# Patient Record
Sex: FEMALE | Race: BLACK OR AFRICAN AMERICAN
[De-identification: names, ages, dates, MRNs, and addresses within clinical notes are randomized per-mention and may not be internally consistent; named-entity substitution may affect disease eponyms.]

---

## 2017-03-19 ENCOUNTER — HOSPITAL ENCOUNTER (INPATIENT)
Dept: HOSPITAL 61 - ER | Age: 72
LOS: 1 days | Discharge: HOME | DRG: 916 | End: 2017-03-20
Attending: INTERNAL MEDICINE | Admitting: INTERNAL MEDICINE
Payer: MEDICARE

## 2017-03-19 VITALS — WEIGHT: 220 LBS | BODY MASS INDEX: 41.54 KG/M2 | HEIGHT: 61 IN

## 2017-03-19 DIAGNOSIS — N39.0: ICD-10-CM

## 2017-03-19 DIAGNOSIS — J44.9: ICD-10-CM

## 2017-03-19 DIAGNOSIS — Z91.013: ICD-10-CM

## 2017-03-19 DIAGNOSIS — T78.2XXA: Primary | ICD-10-CM

## 2017-03-19 DIAGNOSIS — R21: ICD-10-CM

## 2017-03-19 DIAGNOSIS — Z79.51: ICD-10-CM

## 2017-03-19 DIAGNOSIS — R22.1: ICD-10-CM

## 2017-03-19 DIAGNOSIS — Z98.84: ICD-10-CM

## 2017-03-19 DIAGNOSIS — Z88.1: ICD-10-CM

## 2017-03-19 DIAGNOSIS — Z88.5: ICD-10-CM

## 2017-03-19 DIAGNOSIS — I10: ICD-10-CM

## 2017-03-19 DIAGNOSIS — L27.2: ICD-10-CM

## 2017-03-19 DIAGNOSIS — Z98.51: ICD-10-CM

## 2017-03-19 DIAGNOSIS — J45.909: ICD-10-CM

## 2017-03-19 DIAGNOSIS — Z88.8: ICD-10-CM

## 2017-03-19 DIAGNOSIS — R00.0: ICD-10-CM

## 2017-03-19 LAB
ANION GAP SERPL CALC-SCNC: 12 MMOL/L (ref 6–14)
BASOPHILS # BLD AUTO: 0.1 X10^3/UL (ref 0–0.2)
BASOPHILS NFR BLD: 1 % (ref 0–3)
BUN SERPL-MCNC: 19 MG/DL (ref 7–20)
CALCIUM SERPL-MCNC: 8.8 MG/DL (ref 8.5–10.1)
CHLORIDE SERPL-SCNC: 108 MMOL/L (ref 98–107)
CO2 SERPL-SCNC: 26 MMOL/L (ref 21–32)
CREAT SERPL-MCNC: 0.9 MG/DL (ref 0.6–1)
EOSINOPHIL NFR BLD: 4 % (ref 0–3)
ERYTHROCYTE [DISTWIDTH] IN BLOOD BY AUTOMATED COUNT: 16 % (ref 11.5–14.5)
GFR SERPLBLD BASED ON 1.73 SQ M-ARVRAT: 75.1 ML/MIN
GLUCOSE SERPL-MCNC: 164 MG/DL (ref 70–99)
HCT VFR BLD CALC: 38.2 % (ref 36–47)
HGB BLD-MCNC: 12.3 G/DL (ref 12–15.5)
LYMPHOCYTES # BLD: 3.6 X10^3/UL (ref 1–4.8)
LYMPHOCYTES NFR BLD AUTO: 39 % (ref 24–48)
MCH RBC QN AUTO: 31 PG (ref 25–35)
MCHC RBC AUTO-ENTMCNC: 32 G/DL (ref 31–37)
MCV RBC AUTO: 95 FL (ref 79–100)
MONOCYTES NFR BLD: 8 % (ref 0–9)
NEUTROPHILS NFR BLD AUTO: 49 % (ref 31–73)
PLATELET # BLD AUTO: 416 X10^3/UL (ref 140–400)
POTASSIUM SERPL-SCNC: 4.1 MMOL/L (ref 3.5–5.1)
RBC # BLD AUTO: 4.02 X10^6/UL (ref 3.5–5.4)
SODIUM SERPL-SCNC: 146 MMOL/L (ref 136–145)
WBC # BLD AUTO: 9.3 X10^3/UL (ref 4–11)

## 2017-03-19 PROCEDURE — 87641 MR-STAPH DNA AMP PROBE: CPT

## 2017-03-19 PROCEDURE — 85027 COMPLETE CBC AUTOMATED: CPT

## 2017-03-19 PROCEDURE — 96361 HYDRATE IV INFUSION ADD-ON: CPT

## 2017-03-19 PROCEDURE — 84443 ASSAY THYROID STIM HORMONE: CPT

## 2017-03-19 PROCEDURE — 87086 URINE CULTURE/COLONY COUNT: CPT

## 2017-03-19 PROCEDURE — 71010: CPT

## 2017-03-19 PROCEDURE — 93005 ELECTROCARDIOGRAM TRACING: CPT

## 2017-03-19 PROCEDURE — 36415 COLL VENOUS BLD VENIPUNCTURE: CPT

## 2017-03-19 PROCEDURE — 80048 BASIC METABOLIC PNL TOTAL CA: CPT

## 2017-03-19 PROCEDURE — S0028 INJECTION, FAMOTIDINE, 20 MG: HCPCS

## 2017-03-19 PROCEDURE — 81001 URINALYSIS AUTO W/SCOPE: CPT

## 2017-03-19 PROCEDURE — 96374 THER/PROPH/DIAG INJ IV PUSH: CPT

## 2017-03-19 NOTE — ED.ADGEN
Past Medical History


Past Medical History:  Asthma, Hypertension


Additional Past Medical Histor:  Pericarditis.


Past Surgical History:  Tonsillectomy, Tubal ligation


Additional Past Surgical Histo:  Gastric Bypass, fistula removal, breast glands 

removed (49 years ago).


Alcohol Use:  None


Drug Use:  None





Adult General


Chief Complaint


Chief Complaint:  ALLERGIC REACTION





HPI


HPI


Patient is a 71  year old patient is a 71-year-old woman, who presents to the 

emergency department with complaint of facial flushing, it will body rash and 

itching, and tightness around the mouth that developed after eating chocolate 

covered pomegranate. Patient states that she's experienced allergic reactions 

to other foods the medications previously, but not to this particular food. She 

states this started a few moments after eating the food, and about 30 minutes 

prior to my evaluation the ED. She is not taking any medications prior to 

coming to the ED. Denies difficulty swallowing or breathing, any shortness of 

breath, any chest pain, although she states that she feels as though her heart 

is racing, currently she is tachycardic in the low 100s, denies any nausea, 

vomiting or diarrhea. No other complaints.





Review of Systems


Review of Systems


Constitutional:  Denies fever or chills. []


Eyes:  Denies change in visual acuity. []


HENT:  Denies nasal congestion or sore throat. [] 


Respiratory:  Denies cough or shortness of breath. [] 


Cardiovascular:  Denies chest pain or edema. [] 


GI:  Denies abdominal pain, nausea, vomiting, bloody stools or diarrhea. [] 


:  Denies dysuria. [] 


Musculoskeletal:  Denies back pain or joint pain. [] 


Integument: Rash and flushing. Itching.


Neurologic:  Denies headache, focal weakness or sensory changes. [] 


Endocrine:  Denies polyuria or polydipsia. [] 


Lymphatic:  Denies swollen glands. [] 


Psychiatric:  Denies depression or anxiety. []





Current Medications


Current Medications





 Current Medications








 Medications


  (Trade)  Dose


 Ordered  Sig/Hugh  Start Time


 Stop Time Status Last Admin


Dose Admin


 


 Diphenhydramine


 HCl


  (Benadryl)  25 mg  1X  ONCE  3/19/17 20:30


 3/19/17 20:32 DC 3/19/17 20:39


25 MG


 


 Famotidine 20 mg  20 mg  1X  ONCE  3/19/17 20:30


 3/19/17 20:32 DC 3/19/17 20:39


20 MG


 


 Lactated Ringer's


  (Iv Lactated


 Ringers)  1,000 ml @ 


 1,000 mls/hr  Q1H  3/19/17 20:26


 3/19/17 21:25 DC 3/19/17 20:39


1,000 MLS/HR


 


 Methylprednisolone


 Sodium Succinate


  (Solu-Medrol


 125mg Vial)  125 mg  1X  ONCE  3/19/17 20:30


 3/19/17 20:32 DC 3/19/17 20:39


125 MG











Allergies


Allergies





 Allergies








Coded Allergies Type Severity Reaction Last Updated Verified


 


  Trimethobenzamide HCl Allergy Intermediate  16 Yes


 


  acetaminophen Allergy Intermediate  16 Yes


 


  amoxicillin Allergy Intermediate  16 No


 


  clavulanic acid Allergy Intermediate  16 No


 


  codeine Allergy Intermediate Tolerates Morphine 16 Yes


 


  dichloralphenazone Allergy Intermediate  16 Yes


 


  isometheptene mucate Allergy Intermediate  16 Yes


 


  levofloxacin Allergy Intermediate  16 Yes


 


  metoprolol Allergy Intermediate Rash 16 Yes


 


  shellfish derived Allergy Intermediate  16 Yes











Physical Exam


Physical Exam





Constitutional: Well developed, well nourished, no acute distress, patient with 

facial flushing, no discrete lesions. []


HENT: Normocephalic, atraumatic, bilateral external ears normal, oropharynx 

moist, no oral exudates, nose normal. []


Eyes: PERRLA, EOMI, conjunctiva normal, no discharge. [] 


Neck: Normal range of motion, no tenderness, supple, no stridor. Oropharynx is 

normal in appearance. [] 


Cardiovascular:Heart rate regular rhythm, no murmur, S1, S2, mild tachycardic. [

]


Lungs & Thorax:  Bilateral breath sounds clear to auscultation, no wheezing, 

rhonchi, rales. No chest tenderness or crepitus. []


Abdomen: Bowel sounds normal, soft, no tenderness, no masses, no pulsatile 

masses. [] 


Skin: Warm, dry, patient with facial flushing, no urticarial lesions, no other 

abnormalities identified.


Back: No tenderness, no CVA tenderness. [] 


Extremities: No tenderness, no cyanosis, no clubbing, ROM intact, no edema. [] 


Neurologic: Alert and oriented X 3, normal motor function, normal sensory 

function, no focal deficits noted. []


Psychologic: Affect normal, judgement normal, mood normal. []





Current Patient Data


Vital Signs





 Vital Signs








  Date Time  Temp Pulse Resp B/P Pulse Ox O2 Delivery O2 Flow Rate FiO2


 


3/19/17 23:51  104 20 144/65 96 Room Air  


 


3/19/17 20:04 98.1       





 98.1       








Lab Values





 Laboratory Tests








Test


  3/19/17


20:30


 


White Blood Count


  9.3x10^3/uL


(4.0-11.0)


 


Red Blood Count


  4.02x10^6/uL


(3.50-5.40)


 


Hemoglobin


  12.3g/dL


(12.0-15.5)


 


Hematocrit


  38.2%


(36.0-47.0)


 


Mean Corpuscular Volume 95fL ()  


 


Mean Corpuscular Hemoglobin 31pg (25-35)  


 


Mean Corpuscular Hemoglobin


Concent 32g/dL (31-37)


 


 


Red Cell Distribution Width


  16.0%


(11.5-14.5)  H


 


Platelet Count


  416x10^3/uL


(140-400)  H


 


Neutrophils (%) (Auto) 49% (31-73)  


 


Lymphocytes (%) (Auto) 39% (24-48)  


 


Monocytes (%) (Auto) 8% (0-9)  


 


Eosinophils (%) (Auto) 4% (0-3)  H


 


Basophils (%) (Auto) 1% (0-3)  


 


Neutrophils # (Auto)


  4.6x10^3uL


(1.8-7.7)


 


Lymphocytes # (Auto)


  3.6x10^3/uL


(1.0-4.8)


 


Monocytes # (Auto)


  0.7x10^3/uL


(0.0-1.1)


 


Eosinophils # (Auto)


  0.4x10^3/uL


(0.0-0.7)


 


Basophils # (Auto)


  0.1x10^3/uL


(0.0-0.2)


 


Sodium Level


  146mmol/L


(136-145)  H


 


Potassium Level


  4.1mmol/L


(3.5-5.1)


 


Chloride Level


  108mmol/L


()  H


 


Carbon Dioxide Level


  26mmol/L


(21-32)


 


Anion Gap 12 (6-14)  


 


Blood Urea Nitrogen


  19mg/dL (7-20)


 


 


Creatinine


  0.9mg/dL


(0.6-1.0)


 


Estimated GFR


(Cockcroft-Gault) 75.1  


 


 


Glucose Level


  164mg/dL


(70-99)  H


 


Calcium Level


  8.8mg/dL


(8.5-10.1)





 Laboratory Tests


3/19/17 20:30








 Laboratory Tests


3/19/17 20:30











EKG


EKG


EC/3/13: Sinus rhythm, heart rate 95 beats minute, upright axis, QTC of 468

, , QRS of 80, no ST elevations or depressions, does not meet STEMI 

criteria. As interpreted by me.





Radiology/Procedures


Radiology/Procedures


Chest x-ray: One view: Normal cardiac silhouette, suboptimal respiratory effort

, no pneumothorax, no infiltrates, no effusions, no soft tissue or bony 

abnormalities identified. As interpreted by me.





Course & Med Decision Making


Course & Med Decision Making


Pertinent Labs and Imaging studies reviewed. (See chart for details)





Patient received Solu-Medrol, IV fluids, famotidine and Benadryl in the ED. On 

reevaluation her flushing and other symptoms have resolved. No indication for 

epinephrine. She is resting comfortably at this time. Patient observed in the 

emergency department for a 3 hours, more than 4 hours out from onset of 

symptoms. She states she is feeling well, and denies any recurrent or 

persistent symptoms. Patient ambulated in the ED, denies any dizziness, 

lightheadedness, chest pain or short of breath, however patient noted become 

tachycardic into the 130s, oxygen saturation remained in the mid 90s. Did 

discuss this with patient, she denies any new or different symptoms, no other 

physical or historical abnormalities identified. Chest x-ray obtained along 

with ECG, no concerning findings identified, laboratory studies also not reveal 

any acutely concerning findings. Due to persistent tachycardia, patient is 

agreeable for admission to the hospital for continued monitoring, IV hydration. 

Findings as above discussed with Dr. Daily of internal medicine, patient 

accepted to her service for continued monitoring and additional evaluation. 

Bridge orders entered per discussion.





Dragon Disclaimer


Dragon Disclaimer


This electronic medical record was generated, in whole or in part, using a 

voice recognition dictation system.





Departure


Impression:  


 Primary Impression:  


 Allergic reaction


 Additional Impression:  


 Tachycardia


Disposition:  09 ADMITTED AS INPATIENT


Admitting Physician:  Other


Condition:  IMPROVED


Scripts


Epinephrine (Epipen 2-Radhames)0.3 Mg/0.3 Ml Auto.injct0.3 Mg IJ 1X PRN ANAPHYLAXIS #

1   Ref 1


   Prov:TARSHA SAAVEDRA DO         3/19/17





Problem Qualifiers








TARSHA SAAVEDRA DO Mar 19, 2017 20:45

## 2017-03-20 VITALS — SYSTOLIC BLOOD PRESSURE: 131 MMHG | DIASTOLIC BLOOD PRESSURE: 54 MMHG

## 2017-03-20 VITALS — SYSTOLIC BLOOD PRESSURE: 153 MMHG | DIASTOLIC BLOOD PRESSURE: 93 MMHG

## 2017-03-20 VITALS — DIASTOLIC BLOOD PRESSURE: 63 MMHG | SYSTOLIC BLOOD PRESSURE: 130 MMHG

## 2017-03-20 VITALS — SYSTOLIC BLOOD PRESSURE: 146 MMHG | DIASTOLIC BLOOD PRESSURE: 64 MMHG

## 2017-03-20 LAB
BACTERIA #/AREA URNS HPF: (no result) /HPF
BILIRUB UR QL STRIP: NEGATIVE
GLUCOSE UR STRIP-MCNC: NEGATIVE MG/DL
NITRITE UR QL STRIP: POSITIVE
PH UR STRIP: 6 [PH]
PROT UR STRIP-MCNC: NEGATIVE MG/DL
RBC #/AREA URNS HPF: (no result) /HPF (ref 0–2)
SP GR UR STRIP: 1.02
SQUAMOUS #/AREA URNS LPF: (no result) /LPF
UROBILINOGEN UR-MCNC: 1 MG/DL
WBC #/AREA URNS HPF: (no result) /HPF (ref 0–4)

## 2017-03-20 RX ADMIN — BACITRACIN SCH MLS/HR: 5000 INJECTION, POWDER, FOR SOLUTION INTRAMUSCULAR at 11:06

## 2017-03-20 RX ADMIN — BACITRACIN SCH MLS/HR: 5000 INJECTION, POWDER, FOR SOLUTION INTRAMUSCULAR at 06:24

## 2017-03-20 NOTE — PDOC
Provider Note


Provider Note


Same day admit and dc summ. JOb # [id number]








LOS CRESPO MD Mar 20, 2017 10:23

## 2017-03-20 NOTE — EKG
[facility]

              [address] [place] [address]

Test Date:    2017               Test Time:    23:13:36

Pat Name:     TERESA MAXWELL               Department:   

Patient ID:   [id number]           Room:          

Gender:       F                        Technician:   

:          [id number]               Requested By: TARSHA SAAVEDRA

Order Number: [id number]            Reading MD:     

                                 Measurements

Intervals                              Axis          

Rate:         95                       P:            34

CT:           180                      QRS:          14

QRSD:         80                       T:            29

QT:           370                                    

QTc:          468                                    

                           Interpretive Statements

SINUS RHYTHM

LEFT ATRIAL ABNORMALITY

RI6.01          Unconfirmed report

No previous ECG available for comparison

## 2017-03-20 NOTE — RAD
Portable chest, 3/19/2017:



History: Tachycardia



Comparison is made to a study from 2/6/2015. The heart is at the upper limits

of normal in size. There is mild tortuosity of the thoracic aorta. The

pulmonary vascularity is normal. No pulmonary infiltrates are seen. There is

no evidence of pleural fluid.



IMPRESSION: No acute cardiopulmonary abnormality is detected.

## 2017-03-20 NOTE — [facility]
Admit Criteria Forms


                  GENERAL ADMISSION CRITERIA


 


                                                                               

   (Place 'X' for any and all applicable criteria):





Admission is indicated for ANY ONE of the following:





[ ]I.     Hemodynamic instability as indicated by ANY ONE of the following(1)(2)

(3)(4)(5):


          [ ]a) Vital sign abnormality not readily corrected by appropriate 

treatment within 12 to 24 hours indicated by ANY ONE of the following: 


                 [ ]i)    Hypotension


                 [ ]ii)   Symptomatic Tachycardia unresponsive to treatment (eg

, analgesia, fluids, sedation as indicated)


                 [ ]iii)  Orthostatic vital sign changes unresponsive to 

treatment (eg, fluids)


          [ ]b) Vital sign abnormality that is severe indicated by ANY ONE of 

the following:


                 [ ]i)    Inadequate perfusion indicated by ANY ONE of the 

following:


                          [ ]1)   Lactic acidosis (greater than 2 mmol/L)


                          [ ]2)   New abnormal capillary refill (greater than 3 

seconds)


                          [ ]3)   Other metabolic acidosis (arterial pH less 

than 7.35) not otherwise explained


                          [ ]4)   Reduced urine output


                          [ ]5)   Altered mental status


                          [ ]6)   Myocardial Ischemia


                 [ ]v)    Mean arterial pressure[A] less than 60 mm Hg


                 [ ]vi)   Mean arterial pressure[A] less than 70 mm Hg after 30 

minutes of appropriate treatment (eg, fluid resuscitation)


                 [ ]vii)  IV inotropic or vasopressor medication required to 

maintain adequate blood pressure or perfusion


                 [ ]viii) Sustained heart rate greater than 120 beats per 

minute in adult or child 6 years or older[B]]


[ ]II.    Hypertension requiring inpatient treatment as indicated by ANY ONE of 

the following(6)(7)(8):


                 [ ]a)  SBP greater than 220 mm Hg or DBP greater than 120 mm 

Hg despite treatment


                 [ ]b)  SBP greater than 140 mm Hg or DBP greater than 100 mm 

Hg with evidence of acute end organ 


                         damage as indicated by ANY ONE of the following:


                         [ ]i)    Encephalopathy


                         [ ]ii)   Acute renal failure as indicated by new onset 

of ANY ONE of the following(9)(10)(11)(12)(13):


                                  [ ]1)  A 3-fold rise in serum creatinine from 

baseline


                                  [ ]2) Serum creatinine greater than 4 mg/dL (

354 micromoles/L) with acute rise greater than 0.5 mg/dL (44.2 micromoles/L)


                                  [ ]3)  Reduction of more than 75% in 

estimated glomerular filtration rate from baseline


                                  [ ]4) Estimated glomerular filtration rate 

less than 35 mL/min/1.73m2 (0.59 mL/sec/1.73m2) in child up to 18 years of age


                                  [ ]5) Cessation of urine output indicated by 

ALL of the following: 


                                        [ ]A.   Adequate volume status


                                        [ ]B.   Inadequate urine output as 

indicated by ANY ONE of the following:


                                                [ ]a.     Urine output less 

than 0.3 mL/kg/hr for 24 hours


                                                [ ]b.     Anuria (urine output 

less than 0.1 mL/kg/hr) for 12 hours 


                        [ ]iii)  Aortic dissection


                        [ ]iv)  Myocardial ischemia


                        [ ]v)   Left ventricular heart failure 


                        [ ]vi)  Retinal hemorrhage


                        [ ]vii) Other significant finding 


                 [ ]c)  Hypertension in child requiring inpatient treatment as 

indicated by ALL of the following(14)(15)(16):


                        [ ]i)    Outpatient treatment not effective, not 

available, or not appropriate 


                        [ ]ii)   SBP or DBP greater than 95th percentile for age


                        [ ]iii)  Evidence of acute end organ damage as 

indicated by ANY ONE of the following:


                                 [ ]1)   Altered mental status


                                 [ ]2)   Acute renal failure as indicated by 

new onset of ANY ONE of the following(9)(10)(11)(12)(13):


                                        [ ]A.    A 3-fold rise in serum 

creatinine from baseline


                                        [ ]B.    Serum creatinine greater than 

4 mg/dL (354 micromoles/L) with acute rise greater than 0.5 mg/dL (44.2 

micromoles/L)


                                        [ ]C.    Reduction of more than 75% in 

estimated glomerular filtration rate from baseline


                                        [ ]D.    Estimated glomerular 

filtration rate less than 35 mL/min/1.73m2 (0.59 mL/sec/1.73m2)in child up to 

18 years of age


                                        [ ]E.    Cessation of urine output 

indicated by ALL of the following:


                                                  [ ]a.   Adequate volume status


                                                  [ ]b.   Inadequate urine 

output as indicated by ANY ONE of the following:


                                                           [ ]1)  Urine output 

less than 0.3 mL/kg/hr for 24 hours 


                                                           [ ]2)  Anuria (urine 

output less than 0.1 mL/kg/hr) for 12 hours


                                                           [ ]3)  Severe 

headache


                                                           [ ]4)  Visual 

disturbance


                                                           [ ]5)  Retinal 

hemorrhage


                                                           [ ]6)  Other 

significant finding


[ ]III.   Acute cardiac or peripheral ischemia as indicated by ANY ONE of the 

following:


          [ ]a)  Acute coronary syndrome(17)(18)


          [ ]b)  Acute peripheral ischemia (eg, pulseless, cool, mottled, or 

cyanotic extremity)(19) 


[ ]IV.     Cardiac arrhythmias or findings of immediate concern indicated by 

ANY ONE of the following(20)(21):


           [ ]a)  Heart rhythms that are inherently dangerous or unstable 

indicated by ANY ONE of the following(22)(23)(24):


                  [ ]i)    Resuscitated ventricular fibrillation or cardiac 

arrest 


                  [ ]ii)   Ventricular escape rhythm


                  [ ]iii)  Sustained ventricular tachycardia (30 seconds or 

more of ventricular rhythm at greater than 100 beats per minute)


                  [ ]iv)  Nonsustained ventricular tachycardia and ANY ONE of 

the following:


                          [ ]1)   Suspected cardiac ischemia as cause or 

consequence of ventricular tachycardia


                          [ ]2)   In setting of acute myocarditis


           [ ]b)  Unstable cardiac conduction defects indicated by ANY ONE of 

the following(24)(25)(26):


                  [ ]i)    Type II second-degree atrioventricular block 


                  [ ]ii)   Third-degree atrioventricular block


                  [ ]iii)  New-onset left bundle branch block with suspected 

myocardial ischemia


           [ ]c)  Any heart rhythm and ANY ONE of the following(22)(23)(27)(28)(

29):


                  [ ] i)    Continuous long-term ECG monitoring needed (eg, 

initiation of drug requiring monitoring for more than 24 hours)


                  [ ] ii)   Patient has automatic implanted cardioverter 

defibrillator that is repeatedly firing, malfunctioning, 


                            or in need of immediate adjustment of settings 

beyond the scope of ambulatory or observation care. 


           [ ]d)  Heart rhythms of concern due to ANY ONE of the following:


                  [ ]i)    Hypotension


                  [ ]ii)   Respiratory distress


                  [ ]iii)  Association with other significant symptoms (eg, 

bradycardia with syncope or ongoing dizziness, 


                          supraventricular tachycardia with chest pain) (27)(28)

(30)


[ ] V.          Severe heart failure as indicated by ANY ONE of the following (

31)(32):


            [ ]a)  Respiratory distress 


            [ ]b)  Hypotension


            [ ]c)  Anasarca (refractory to outpatient therapy) 


            [ ]d)  Cardiac arrhythmias of immediate concern 


            [ ]e)  Myocardial ischemia


[ ]VI.     Respiratory abnormalities, including ANY ONE of the following(33)(34)

(35)(36):


           [ ]a)  Respiratory rate greater than 30 breaths per minute 

unresponsive to treatment [A]


           [ ]b)  New saturation of arterial oxygen less than 90%


           [ ]c)  New partial pressure of carbon dioxide greater than 44 mm Hg (

5.9 kPa)


           [ ]d)  Supplemental oxygen or respiratory treatments needed that are 

new or not performable at other levels of care


           [ ]e)  New-onset cyanosis


           [ ]f)   Inability to protect airway


           [ ]g)  Chronic lung disease with severe deterioration (not 

responsive to emergency and observation care treatment as 


                   appropriate) as indicated by ANY ONE of the  following(34)(36

):


                     [ ]i)    SaO2 5% below baseline in patient with chronic 

hypoxemia


                     [ ]ii)   New requirement for supplemental oxygen to keep 

SaO2 at baseline or acceptable level


                     [ ]iii)  Required supplemental oxygen performable only in 

acute inpatient setting


                     [ ]iv)   Severe airflow or ventilation abnormalities


                     [ ]v)    Previously mobile patient unable to walk between 

rooms 


                     [ ]vi    Inability to eat or sleep due to dyspnea


                     [ ]vii)  Rapid rate of exacerbation onset 


                     [ ]viii) Altered mental status


 ]VII.  Severe airflow or ventilation abnormalities (not responsive to 

emergency and observation care treatment as appropriate) as 


         indicated by ANY  ONE of the following(33)(34)(35)(37):


          [ ]a)  PCO2 greater than 42 mm Hg (5.6 kPa) and pH less than 7.35 (new

)


          [ ]b)  Documented PCO2 increased more than 5 mm Hg (0.7 kPa) from 

disease baseline


          [ ]c)  Airflow measurements [B] less than 60% of previous best or 

predicted (eg, peak expiratory flow rate less than 300 L/minute)


                  despite intensive emergent treatment [C]


          [ ]d)  Required respiratory treatments that are performable only in 

acute inpatient setting


[ ]VIII.  Impending or actual respiratory arrest  ( Also use Respiratory 

Failure GRG  for severe respiratory disease and


          long-term mechanical ventilation patients)


[ ]IX.    Neurologic abnormalities, including ANY ONE of the following:


          [ ]a)  New findings that suggest ANY ONE of the following:


                 [ ]i)    CNS infection(38)


                 [ ]ii)   Cerebral bleeding, ischemia, or vasospasm(39)(40)


                 [ ]iii)  Increased intracranial pressure, hydrocephalus, or 

cerebral edema(41)(42)(43)


                 [ ]iv)  Spinal cord injury(44)


         [ ]b)  Uncontrolled seizures(45)


         [ ]c)  New-onset coma (eg, Sarai coma scale score less than 9) or 

unexplained abnormal mental status 


                (eg, Sarai coma scale score less than 14) [D](41)(46)(47)


[ ]X.   New-onset severe neurologic findings requiring inpatient care; examples 

include(42)(48)(49):


         [ ]a)  Papilledema


         [ ]b)  Cerebral edema


         [ ]c)  Mass effect on CT scan


[ ]XI.    Suspected acute intra-abdominal process with peritoneal signs, 

abdominal mass, or similar findings (50)(51)(52)


[ ]XII.   Severe physiologic disorder remaining after emergency or observation 

level care (as appropriate) as indicated by 


         ANY ONE of the following (53):


         [ ]a)  Significant dehydration


         [ ]b)  Diabetic ketoacidosis


         [ ]c)  Hyperglycemic hyperosmolar state (eg, osmolality greater than 

320 mOsm/kg (mmol/kg)


         [ ]d)  Hypoglycemia


         [ ]e)  Other (new) acid-base disorder with pH less than 7.35 or 

greater than 7.5(54)


         [ ]f)  Thyroid storm (55)


         [ ]g)  Myxedema coma (55)


[ ]XIII.  Abdominal abnormalities with ANY ONE of the following(56)(57):


         [ ]a)  Absent bowel sounds with complete ileus


         [ ]b)  Signs of intestinal obstruction or peritonitis [E]


         [ ]c)  Nausea and vomiting that cannot be controlled with outpatient 

or observation care


[ ]XIV. Acute renal failure as indicated by new onset of ANY ONE of the 

following(9)(10)(11)(12)(13):


          [ ]a)  A 3-fold rise in serum creatinine from baseline


          [ ]b)  Serum creatinine greater than 4 mg/dL (354 micromoles/L) with 

acute rise greater than 0.5 mg/dL (44.2 micromoles/L)


          [ ]c)  Reduction of more than 75% in estimated glomerular filtration 

rate from baseline


          [ ]d)  Estimated glomerular filtration rate less than 35 mL/min/

1.73m2 (0.59 mL/sec/1.73m2) in child up to 18 years of age


          [ ]e)  Cessation of urine output indicated by ALL of the following:


                 [ ]i)    Adequate volume status


                 [ ]ii)   Inadequate urine output as indicated by ANY ONE of 

the following:


                         [ ]1)   Urine output less than 0.3 mL/kg/hr for 24 

hours


                         [ ]2)   Anuria (urine output less than 0.1 mL/kg/hr) 

for 12 hours


[ ]XV.  Significant uremic complications as indicated by ANY ONE of the 

following(58)(59)(60):


          [ ]a)  Outpatient therapy is ineffective or not feasible for ANY ONE 

of the following:


                 [ ]i)    Severe heart failure 


                 [ ]ii)   Severehypertension 


                 [ ]iii)  Pleural effusion


                 [ ]iv)  Pericarditis or pericardial effusion


           [ ]b)  Cardiac arrhythmias of immediate concern     


           [ ]c)  Intractable nausea or vomiting


           [ ]d)  Recurrent seizures 


           [ ]e)  Encephalopathy


           [ ]f)   Bleeding abnormalities (eg, platelet dysfunction) with 

active (eg, gastrointestinal) bleeding 


           [ ]g)  Dialysis indicated before long-term access or ambulatory 

arrangements can be made


           [ ]h)  Significant metabolic or electrolyte abnormalities (eg, 

severe acidosis or hyperkalemia)


[ ]XVI.  High fever or other high-risk infection situation as indicated by ANY 

ONE of the following(61)(62)(63)(64):


           [ ]a)  Outpatient and observation care antimicrobial treatment 

unavailable, not effective, or not appropriate 


           [ ]b)  Documented bacteremia


           [ ]c)  Temperature greater than 40.5 degrees C (104.9 degrees F) (

oral)


           [ ]d)  Temperature greater than 39.5 degrees C (103.1 degrees F) (

oral) or less than 36 degrees C (96.8 degrees F)


                   (rectal) that does not respond to e  treatment and 

observation care


[ ] XVII.  Temperature less than 95 degrees F (35 degrees C)(rectal)(65)


[ ] XVIII. Severe nutritional abnormalities as indicated by ALL of the following

(66)(67):


           [ ]a)  Inability to tolerate or establish sufficient oral or other 

enteral nutrition in outpatient setting 


           [ ]b)  Parenteral nutrition regimen need that must be implemented on 

inpatient basis


[ ] XIX.  Severe electrolyte abnormalities indicated by ALL of the following(68)

(69)(70):


           [ ]a)  Electrolytes and associated findings are not as expected for 

patient baseline or acceptable treatment effects.


           [ ]b)  Severe abnormalities indicated by ANY ONE of the following:


                  [ ]i)  Sodium less than 130 mEq/L (mmol/L) (new)


                  [ ]ii)Sodium less than 135 mEq/L (mmol/L) with ANY ONE of the 

following:


                        [ ]1)   Uncorrectable (to near normal or chronic 

baseline) after trial of outpatient and emergency treatment


                        [ ]2)   Altered mental status


                        [ ]3)   Seizures


                        [ ]4)   Severe medical etiology requiring inpatient 

management (eg, heart failure, hypovolemia)                 


                  [ ]iii)  Sodium greater than 155 mEq/L (mmol/L)


                  [ ]iv)  Sodium greater than 150 mEq/L (mmol/L) with ANY ONE 

of the following:


                        [ ]1)   Uncorrectable (to near normal or chronic 

baseline) with outpatient and emergency treatment


                        [ ]2)   Altered mental status


                        [ ]3)   Seizures


                        [ ]4)   Severe medical etiology (eg, hypovolemia, 

diabetes insipidus)


                  [ ]v)   Potassium less than 2.5 mEq/L (mmol/L) despite 

outpatient and emergency treatment 


                  [ ]vi)  Potassium less than 3 mEq/L (mmol/L) with ANY ONE of 

the following:


                         [ ]1)   Weakness


                         [ ]2)   Cardiac abnormality (eg, arrhythmia, 

conduction disturbance)


                         [ ]3)   Cardiac ischemia


                         [ ]4)   Ileus


                         [ ]5)   Ongoing medical cause requiring inpatient 

management (eg, acute renal wasting or SIADH)


                         [ ]6)   Other severe symptoms                 


                 [ ]vii) Potassium greater than 6.5 mEq/L (mmol/L)


                 [ ]viii) Potassium greater than 5 mEq/L (mmol/L) with ANY ONE 

of the following:


                        [ ]1)   Uncorrectable (to near normal or chronic 

baseline) with outpatient and emergency treatment


                        [ ]2)   Severe ECG findings [F]


                        [ ]3)   Acute worsening of renal failure (creatinine 

greater than 2.5 mg/dL (221 micromoles/L) or significant elevation for age and 

size)


                        [ ]4)   Severe weakness


                        [ ]5)   Severe medical etiology (eg, hemolysis, 

infection, drug overdose)


                 [ ]ix)  Calcium less than 7 mg/dL (1.75 mmol/L) despite 

outpatient and emergency treatment (72)


                 [ ]x)  Calcium less than 8 mg/dL (2 mmol/L) with significant 

symptoms or findings; examples include(72):                       


                         [ ]1)   Altered mental status


                         [ ]2)   Muscle spasms


                         [ ]3)   Seizures


                         [ ]4)   Breathing difficulty


                         [ ]5)   Cardiac abnormality (eg, arrhythmia or 

conduction disturbance)


                [ ]xi)  Calcium greater than 14 mg/dL (3.5 mmol/L)(72)


                [ ]xii) Calcium greater than 12 mg/dL (3 mmol/L) with ANY ONE 

of the following(72):


                         [ ]1)   Uncorrectable (to near normal or chronic 

baseline) with outpatient and emergency treatment


                         [ ]2)   Significant dehydration or hypovolemia as 

indicated by ALL of the following(70)(73)(74):


                                  [ ]A.  Not resolved with initial treatments


                                  [ ]B.  Clinically significant dehydration as 

indicated by ANY ONE of the following:


                                           [ ]a. Vomiting refractory to 

outpatient treatment (ie, precluding oral rehydration)


                                           [ ]b. Inability to drink


                                           [ ]c. Hypernatremia or other 

electrolyte abnormality unable to be corrected with outpatient and emergency 

treatment


                                           [ ]d. Failure to remain hydrated 

with outpatient therapy 


                                           [ ]e. Reduced urine output


                                           [ ]f. Hypotension


                                           [ ]g. Serious cause for dehydration 

requiring acute hospitalization (eg, bowel obstruction, increased 


                                                  intracranial pressure, 

infectious cause)


                                           [ ]h. Child with ANY ONE of the 

following(75):


                                                  [ ]1)  Severe abdominal 

tenderness


                                                  [ ]2)  Adequate care not 

available at home     


                                                  [ ]3)  Severe dehydration (

greater than 9% loss of body weight)


                                                  [ ]4)  Significant symptoms 

or findings; examples include: 


                                                          [ ]A. Altered mental 

status


                                                          [ ]B. Cardiac 

abnormality (eg, arrhythmia, conduction disturbance) 


                                                          [ ]C. Malignant 

etiology requiring inpatient treatment


                [ ]xiii)  Phosphorus less than 1 mg/dL (0.32 mmol/L)


                [ ]xiv)  Phosphorus less than 1.5 mg/dL (0.48 mmol/L) with ANY 

ONE of the following:


                          [ ]1)   Patient unresponsive to outpatient and 

emergency treatment


                          [ ]2)   Significant symptoms or findings; examples 

include: 


                                  [ ]A.  Weakness


                                  [ ]B. Altered mental status 


                                  [ ]C. Breathing difficulty 


                                  [ ]D. Seizures


                                  [ ]E. Rhabdomyolysis


                [ ]xv)   Phosphorus greater than 10 mg/dL (3.2 mmol/L)


                [ ]xvi)  Phosphorus greater than 4.5 mg/dL (1.45 mmol/L) (new) 

with ANY ONE of the following:


                          [ ]1)   Severe medical etiology (eg, crush injury, 

acute renal failure)


                          [ ]2)   Associated hypocalcemia with significant 

findings; examples include: 


                                  [ ]A.    Neurologic symptoms


                                  [ ]B.    Altered mental status


                                  [ ]C.    Muscle spasms


                                  [ ]D.    Seizures


                                  [ ]E.    Breathing difficulty


                                  [ ]F.    Cardiac abnormality (eg, arrhythmia, 

conduction disturbance)


                [ ]xvii)   Magnesium less than 1 mg/dL (0.41 mmol/L)


                [ ]xviii)  Magnesium less than 1.5 mg/dL (0.62 mmol/L) with ANY 

ONE of the following:


                           [ ]1)   Patient unresponsive to outpatient and 

emergency treatment


                           [ ]2)   Associated hypocalcemia with significant 

findings; examples include: 


                                   [ ]A.    Altered mental status


                                   [ ]B.    Muscle spasms


                                   [ ]C.    Seizures


                                   [ ]D.    Breathing difficulty


                                   [ ]E.    Cardiac abnormality (eg, arrhythmia

, conduction disturbance)


                           [ ]3)   Associated hypokalemia (potassium less than 

3 mEq/L (mmol/L)) with risk of arrhythmia 


                [ ]xix)  Magnesium greater than 4 mEq/L (2 mmol/L) 


                [ ]xx)   Magnesium greater than 2.5 mEq/L (1.25 mmol/L) with 

significant symptoms or findings; examples include:


                          [ ]1)   Weakness


                          [ ]2)   Altered mental status


                          [ ]3)   Cardiac abnormality (eg, arrhythmia, 

conduction disturbance)


                          [ ]4)   Breathing difficulty


                          [ ]5)   Severe medical etiology (eg, renal failure, 

hypovolemia)


               [ ]xxi)   Uric acid greater than 20 mg/dL (1190 micromoles/L)(76)


               [ ]xxii)  Uric acid greater than 8 mg/dL (476 micromoles/L) with 

significant symptoms or findings of tumor


                          lysis syndrome; examples include(76):


                          [ ]1)   Creatinine greater than 1.5 times upper limit 

of normal


                          [ ]2)   Cardiac abnormality (eg, arrhythmia, 

conduction disturbance)


                          [ ]3)   Seizure


[ ]XX.   Acute blood loss causing significant abnormality as indicated by ANY 

ONE of the following(77)(78):


         [ ]a)  Hemoglobin less than 10 g/dL (100 g/L) (not baseline)


         [ ]b)  Hematocrit less than 30% (0.30) (not baseline)


         [ ]c)  Repeat hematocrit decreased more than 2% (0.02)


         [ ]d)  Uncontrolled bleeding


[ ]XXI. Severe anemia indicated by ANY ONE of the following(78)(79):


         [ ]a)  Altered mental status 


         [ ]b)  Chest pain


         [ ]c)  Exertional dyspnea 


         [ ]d)  Syncope


         [ ]e)  Other findings suggesting inadequate perfusion


         [ ]f)   Treatment with transfusion or volume replacement is 

ineffective at resolving ANY ONE of the following [G]:


                 [ ]i)    Tachycardia for age


                 [ ]ii)   Orthostatic vital sign changes as indicated by ANY ONE

 of the following(80):


                         [ ]1)    Fall in SBP of 20 mm Hg or more 1 to 3 

minutes after patient sits or stands from recumbent position


                         [ ]2)    Fall in DBP of 10 mm Hg or more 1 to 3 

minutes after patient sits or stands from recumbent position


[ ]XXII. High-risk low platelet count as indicated by ANY ONE of the following(

81)(82):


          [ ]a)  Severe or life-threatening bleeding (eg, intracranial, major 

gastrointestinal, or extensive mucosal bleeding),


                  with any reduced platelet count


          [ ]b)  Platelet count less than 20,000/mm3 (20 x109/L) with any 

active bleeding


          [ ]c)  Platelet count less than 10,000/mm3 (10 x109/L) with minor 

purpura or petechiae 


          [ ]d)  Platelet count less than 5000/mm3 (5 x109/L)


          [ ]e)  Low platelet count with hemolytic anemia


[ ]XXIII.  Disseminated intravascular coagulation(77)(83)


[ ]XXIV. Severe adverse drug or systemic toxin reaction requiring inpatient 

treatment; examples include(84)(85):          


          [ ]a)  Serotonin syndrome(86)


          [ ]b)  Neuroleptic malignant syndrome(86)


          [ ]c)  Cholinergic syndrome with severe symptoms (eg, bronchorrhea, 

weakness, mental status changes, seizures)


          [ ]d)  Sympathetic syndrome with severe symptoms (eg, seizures, 

mental status changes, cardiac dysrhythmias)


          [ ]e)   Anticholinergic syndrome


[ ]XXV.    Severe pain requiring acute inpatient management as indicated by ALL 

of the following (87)(88)(89):        


          [ ]a)  Continuous or frequent (eg, every 2 to 4 hours) parenteral 

analgesics required [H]


          [ ]b)  Rapid improvement expected from treatment or acute 

intervention (eg, surgery, anesthesia procedure)


[ ]XXVI.Severe behavioral health issues judged unmanageable at a lower level of 

care (eg, residential) in a 


          patient who is ANY ONE of the following(91)          


          [ ]a)  Acutely suicidal


          [ ]b)  A danger to self (eg, self-mutilating or suicidal behavior)


          [ ]c)  A danger to others (eg, assaultive or homicidal behavior)


          [ ]d)   Incapacitated because of grave disability (eg, inability to 

provide for self at lower level of care) (92) 


[X]XXVII. Inpatient monitoring needed; examples include(1)(3)(87)(93)(94)(95)(96

):


          [X]a)  Vital signs, neurologic signs, or vascular checks more 

frequently than every 4 hours


          [ ]b)  Cardiac or respiratory monitoring beyond the scope (eg, over 

24 hours) of observation care


          [ ]c)  Pulmonary artery catheter monitoring


          [ ]d)  Suspected compartment syndrome(97) (98)


          [ ]e)  Cerebral bleeding, hydrocephalus, or vasospasm monitoring


          [ ]f)   Increased intracranial pressure or cerebral edema monitoring


          [ ]g)  Fetal monitoring


[ ]XXVIII. Treatment requiring inpatient care; examples include:


          [ ]a)  IV fluid to replace significant ongoing losses (greater than 3 

L/m2 per day)(53)


          [ ]b)  High concentration oxygen (greater than 40%)(33)(99)(100)


          [ ]c)  Frequent respiratory therapy (more frequently than every 4 

hours) to maintain airflow rates greater 


                  than 60% of baseline(33)(99)(100)


          [ ]d)  Epidural analgesia(87)


          [ ]e)  IV anticoagulation, vasoactive, or antiarrhythmic medication(19

)(23)


          [ ]f)   Acute thrombolytics (generally require 24 hours of observation

)(101)(102)


[ ]XXIX.  Emergency procedures needed; examples include:


          [ ]a)  Emergency inpatient surgery


          [ ]b)  Temporary pacemaker placement(103)


          [ ]c)  Chest tube placement with active evacuation (eg, suction, 

drainage)(104)


          [ ]d)   Emergent cardioversion(105)


          [ ]e)  Emergent cardiac or vascular procedures (eg, cardiac 

catheterization, angioplasty) (17)(18)


          [ ]f)   Emergent dialysis access placement and institution(10)(106)


          [ ]g)  Emergent pericardiocentesis(107)


          [ ]h)  Emergent plasmapheresis or leukapheresis(83)


          [ ]i)   Emergent tracheostomy








The original [employer] content created by [employer] has been revised. 


The portions of the content which have been revised are identified through the 

use of italic text or in bold, and [employer] 


has neither reviewed nor approved the modified material. All other unmodified 

content is copyright  [employer].





Please see references footnoted in the original [employer] edition 

2016








BRITTNY AGUILAR Mar 20, 2017 07:14

## 2017-03-20 NOTE — SSS
ADMIT DATE:  03/19/2017



CHIEF COMPLAINT:  Neck swelling, rash, and allergic reaction.



HISTORY OF PRESENT ILLNESS:  The patient is a 71-year-old -American

female with multiple allergies to food and medications coming in because she ate

quite a number of pomegranate covered with chocolate and since then started to

have some rash, broke out almost like measles-like rash, she claims some swollen

tongue, flushed face, flushed skin, hence went to the Emergency Room.  There was

no respiratory compromise.  There was no shortness of breath or wheezing. 

Norepinephrine was given.  She did get Pepcid, Benadryl, and Solu-Medrol 125 mg

x 1.  She was about to be discharged back night, ambulated fine; however, when

she ambulated from the bed to the bathroom, her heart rate shot up to 130 that

was just rather new for her.  Hence the patient was admitted in the observation

to monitor the tachycardia and go from there.



The patient was seen and examined today in the morning.  Tele strips, she

maintained her heart rate, normal sinus rhythm all throughout the night.  Rash

and all other anaphylactic reactions have resolved.  They discussed with them

that steroids, which she got in the Emergency Room, can cause tachycardia.  She

understands.



LABS REVIEWED:  Incidental urinary tract infection.  She denies any symptoms. 

She has leukocyte esterase, bacteria, and wbc 11 to 20 on UA.  Did discuss with

her about starting norepinephrine 1 tab p.o. b.i.d. for 7 days and she agrees. 

Although, she got Solu-Medrol 125 mg IV x 1 in the Emergency Room, no need to

taper her off.  The patient does not have any history of adrenal insufficiency,

and I think she will just do fine in that department.



PAST MEDICAL HISTORY:  Hypertension, COPD/asthma on Advair Diskus 250/50 mcg.



PAST SURGICAL HISTORY:  Reviewed, noncontributory.



SOCIAL HISTORY:  No smoking, no alcohol, no street drugs.



FAMILY HISTORY:  Reviewed, noncontributory.



ALLERGIES:  MULTIPLE INCLUDING TYLENOL, AMOXICILLIN, ____ ACID, CODEINE,

LEVOFLOXACIN, METOPROLOL, SHELLFISH, TETRA.



REVIEW OF SYSTEMS:  All 14-point systems reviewed, otherwise currently negative

except for right sciatic nerve pain and she requests consult ____ for

corticosteroid injection.



PHYSICAL EXAMINATION:

GENERAL:  Awake, alert, oriented x 3.  Not in acute respiratory distress.

HEENT:  Unremarkable.  Right non-hyperemic, ____ tonsils not enlarged.  Tongue

midline.  ____ swelling.

LUNGS:  Clear to auscultation bilaterally.

CARDIOVASCULAR:  Normal rate and rhythm.  No murmurs, rubs, or gallops.

ABDOMEN:  Soft, nontender, and normoactive bowel sounds.

GENITALIA:  Appropriate for age.

EXTREMITIES:  Negative edema.  Pulses were full and equal.  No pallor or

cyanosis of nailbeds.

NEUROLOGIC:  No focal neuro deficits.

PSYCHOLOGIC:  Mood appropriate.



ASSESSMENT AND PLAN:

1.  Anaphylactic reaction after eating chocolate covered pomegranate.

2.  Multiple antibiotic and food allergies.

3.  Hypertension, controlled.

4.  Sinus tachycardia in the background of recent steroid use.

5.  Incidental urinary tract infection, asymptomatic.



PLAN OF CARE:  Nitrofurantoin 100 mg p.o. b.i.d. 1 for 7 days.  No need for

steroid taper.



HOSPITAL COURSE:  The patient admitted over night for the tachycardia.  No

recurrence with tachycardia, anaphylactic reactions have resolved.



DISCHARGE DISPOSITION:  To home.



CONSULTATIONS PERFORMED:  None.



PROCEDURES PERFORMED:  Possible corticosteroid shot for the right sciatic nerve.



Time spend discharging this patient, doing paper works, etc. is 32 minutes.

 



______________________________

LOS CRESPO MD



DR:  /nts  JOB#:  [id number] / [id number]

DD:  03/20/2017 10:21  DT:  03/20/2017 14:10

## 2017-08-04 ENCOUNTER — HOSPITAL ENCOUNTER (EMERGENCY)
Dept: HOSPITAL 61 - ER | Age: 72
Discharge: HOME | End: 2017-08-04
Payer: COMMERCIAL

## 2017-08-04 VITALS — WEIGHT: 220 LBS | BODY MASS INDEX: 41.54 KG/M2 | HEIGHT: 61 IN

## 2017-08-04 VITALS
DIASTOLIC BLOOD PRESSURE: 79 MMHG | SYSTOLIC BLOOD PRESSURE: 140 MMHG | SYSTOLIC BLOOD PRESSURE: 140 MMHG | DIASTOLIC BLOOD PRESSURE: 79 MMHG

## 2017-08-04 DIAGNOSIS — Z88.8: ICD-10-CM

## 2017-08-04 DIAGNOSIS — Z88.6: ICD-10-CM

## 2017-08-04 DIAGNOSIS — Z98.84: ICD-10-CM

## 2017-08-04 DIAGNOSIS — J45.909: ICD-10-CM

## 2017-08-04 DIAGNOSIS — Z91.013: ICD-10-CM

## 2017-08-04 DIAGNOSIS — I10: ICD-10-CM

## 2017-08-04 DIAGNOSIS — R42: Primary | ICD-10-CM

## 2017-08-04 DIAGNOSIS — Z98.51: ICD-10-CM

## 2017-08-04 DIAGNOSIS — Z88.1: ICD-10-CM

## 2017-08-04 DIAGNOSIS — Z88.5: ICD-10-CM

## 2017-08-04 LAB
ANION GAP SERPL CALC-SCNC: 5 MMOL/L (ref 6–14)
BACTERIA #/AREA URNS HPF: (no result) /HPF
BASOPHILS # BLD AUTO: 0.1 X10^3/UL (ref 0–0.2)
BASOPHILS NFR BLD: 1 % (ref 0–3)
BILIRUB UR QL STRIP: NEGATIVE
BUN SERPL-MCNC: 18 MG/DL (ref 7–20)
CALCIUM SERPL-MCNC: 8.4 MG/DL (ref 8.5–10.1)
CHLORIDE SERPL-SCNC: 105 MMOL/L (ref 98–107)
CO2 SERPL-SCNC: 30 MMOL/L (ref 21–32)
CREAT SERPL-MCNC: 1.1 MG/DL (ref 0.6–1)
EOSINOPHIL NFR BLD: 5 % (ref 0–3)
ERYTHROCYTE [DISTWIDTH] IN BLOOD BY AUTOMATED COUNT: 15.4 % (ref 11.5–14.5)
GFR SERPLBLD BASED ON 1.73 SQ M-ARVRAT: 59.4 ML/MIN
GLUCOSE SERPL-MCNC: 139 MG/DL (ref 70–99)
GLUCOSE UR STRIP-MCNC: NEGATIVE MG/DL
HCT VFR BLD CALC: 36.3 % (ref 36–47)
HGB BLD-MCNC: 12.1 G/DL (ref 12–15.5)
LYMPHOCYTES # BLD: 2.7 X10^3/UL (ref 1–4.8)
LYMPHOCYTES NFR BLD AUTO: 25 % (ref 24–48)
MCH RBC QN AUTO: 31 PG (ref 25–35)
MCHC RBC AUTO-ENTMCNC: 33 G/DL (ref 31–37)
MCV RBC AUTO: 94 FL (ref 79–100)
MONOCYTES NFR BLD: 7 % (ref 0–9)
NEUTROPHILS NFR BLD AUTO: 62 % (ref 31–73)
NITRITE UR QL STRIP: NEGATIVE
PH UR STRIP: 5.5 [PH]
PLATELET # BLD AUTO: 340 X10^3/UL (ref 140–400)
POTASSIUM SERPL-SCNC: 3.9 MMOL/L (ref 3.5–5.1)
PROT UR STRIP-MCNC: NEGATIVE MG/DL
RBC # BLD AUTO: 3.86 X10^6/UL (ref 3.5–5.4)
RBC #/AREA URNS HPF: 0 /HPF (ref 0–2)
SODIUM SERPL-SCNC: 140 MMOL/L (ref 136–145)
SP GR UR STRIP: 1.01
SQUAMOUS #/AREA URNS LPF: (no result) /LPF
UROBILINOGEN UR-MCNC: 0.2 MG/DL
WBC # BLD AUTO: 10.7 X10^3/UL (ref 4–11)
WBC #/AREA URNS HPF: (no result) /HPF (ref 0–4)

## 2017-08-04 PROCEDURE — 81001 URINALYSIS AUTO W/SCOPE: CPT

## 2017-08-04 PROCEDURE — 96374 THER/PROPH/DIAG INJ IV PUSH: CPT

## 2017-08-04 PROCEDURE — 70450 CT HEAD/BRAIN W/O DYE: CPT

## 2017-08-04 PROCEDURE — 85027 COMPLETE CBC AUTOMATED: CPT

## 2017-08-04 PROCEDURE — 84484 ASSAY OF TROPONIN QUANT: CPT

## 2017-08-04 PROCEDURE — 99285 EMERGENCY DEPT VISIT HI MDM: CPT

## 2017-08-04 PROCEDURE — 93005 ELECTROCARDIOGRAM TRACING: CPT

## 2017-08-04 PROCEDURE — 36415 COLL VENOUS BLD VENIPUNCTURE: CPT

## 2017-08-04 PROCEDURE — 80048 BASIC METABOLIC PNL TOTAL CA: CPT

## 2017-08-04 PROCEDURE — 96361 HYDRATE IV INFUSION ADD-ON: CPT

## 2017-08-04 NOTE — PHYS DOC
Past Medical History


Past Medical History:  Asthma, Hypertension


Additional Past Medical Histor:  Pericarditis, lichens planus


Past Surgical History:  Tonsillectomy, Tubal ligation


Additional Past Surgical Histo:  Gastric Bypass, fistula removal, breast glands 

removed (49 years ago).


Alcohol Use:  None


Drug Use:  None





Adult General


Chief Complaint


Chief Complaint:  DIZZY/LIGHT HEADED





HPI


HPI


F


Patient is a 72  year old F who presents with dizziness. Patient states she was 

at a banquet tonight and stood up and started having dizziness. Patient 

describes the dizziness as spinning. Patient states this pain is worse when she 

stands up and better when she sits down. Patient has no history of vertigo. 

Patient denies any shortness of breath or chest pain. Patient denies any 

fevers. Patient denies any nausea/vomiting/diarrhea. Patient has no other 

complaints.





Review of Systems


Review of Systems


GEN: Denies fevers, chills, sweats


HEENT: Denies blurred vision, sore throat


CV: Denies chest pain


RESP: Denies shortness of air, cough


GI: Denies n/v/d


NEURO: dizziness


MSK: Denies weakness, joint pain/swelling





Current Medications


Current Medications





Current Medications








 Medications


  (Trade)  Dose


 Ordered  Sig/Hugh  Start Time


 Stop Time Status Last Admin


Dose Admin


 


 Meclizine HCl


  (Antivert)  25 mg  1X  ONCE  8/4/17 21:30


 8/4/17 21:31 DC 8/4/17 22:12


25 MG


 


 Ondansetron HCl


  (Zofran)  4 mg  1X  ONCE  8/4/17 21:30


 8/4/17 21:31 DC 8/4/17 22:12


4 MG


 


 Sodium Chloride  1,000 ml @ 


 1,000 mls/hr  1X  ONCE  8/4/17 21:30


 8/4/17 22:29 DC 8/4/17 22:12


1,000 MLS/HR











Allergies


Allergies





Allergies








Coded Allergies Type Severity Reaction Last Updated Verified


 


  Trimethobenzamide HCl Allergy Intermediate  5/28/16 Yes


 


  acetaminophen Allergy Intermediate  5/28/16 Yes


 


  amoxicillin Allergy Intermediate  5/28/16 No


 


  clavulanic acid Allergy Intermediate  5/28/16 No


 


  codeine Allergy Intermediate Tolerates Morphine 5/28/16 Yes


 


  dichloralphenazone Allergy Intermediate  5/28/16 Yes


 


  isometheptene mucate Allergy Intermediate  5/28/16 Yes


 


  levofloxacin Allergy Intermediate  5/28/16 Yes


 


  metoprolol Allergy Intermediate Rash 5/28/16 Yes


 


  shellfish derived Allergy Intermediate  5/28/16 Yes











Physical Exam


Physical Exam


GEN.:    No apparent distress.  Alert and oriented.


HEENT:    Head is normocephalic, atraumatic


NECK:    Supple.  


LUNGS:    CTAB.


HEART:    RRR, S1, S2 present.  Peripheral pulses intact


ABDOMEN:    Soft, nontender.  Positive bowel sounds.


EXTREMITIES:    Without any cyanosis.    


NEUROLOGIC:     Normal speech, normal tone, cranial nerves II through XII are 

grossly intact without any focal neurologic deficits


PSYCHIATRIC:    Normal affect, normal mood.


SKIN:   No ulcerations





Current Patient Data


Vital Signs





 Vital Signs








  Date Time  Temp Pulse Resp B/P (MAP) Pulse Ox O2 Delivery O2 Flow Rate FiO2


 


8/4/17 22:40  78 16 144/64 (90) 96 Room Air  


 


8/4/17 21:05 98.1       





 98.1       








Lab Values





 Laboratory Tests








Test


  8/4/17


21:45 8/4/17


21:59


 


White Blood Count


  10.7 x10^3/uL


(4.0-11.0) 


 


 


Red Blood Count


  3.86 x10^6/uL


(3.50-5.40) 


 


 


Hemoglobin


  12.1 g/dL


(12.0-15.5) 


 


 


Hematocrit


  36.3 %


(36.0-47.0) 


 


 


Mean Corpuscular Volume


  94 fL ()


  


 


 


Mean Corpuscular Hemoglobin 31 pg (25-35)   


 


Mean Corpuscular Hemoglobin


Concent 33 g/dL


(31-37) 


 


 


Red Cell Distribution Width


  15.4 %


(11.5-14.5)  H 


 


 


Platelet Count


  340 x10^3/uL


(140-400) 


 


 


Neutrophils (%) (Auto) 62 % (31-73)   


 


Lymphocytes (%) (Auto) 25 % (24-48)   


 


Monocytes (%) (Auto) 7 % (0-9)   


 


Eosinophils (%) (Auto) 5 % (0-3)  H 


 


Basophils (%) (Auto) 1 % (0-3)   


 


Neutrophils # (Auto)


  6.6 x10^3uL


(1.8-7.7) 


 


 


Lymphocytes # (Auto)


  2.7 x10^3/uL


(1.0-4.8) 


 


 


Monocytes # (Auto)


  0.8 x10^3/uL


(0.0-1.1) 


 


 


Eosinophils # (Auto)


  0.6 x10^3/uL


(0.0-0.7) 


 


 


Basophils # (Auto)


  0.1 x10^3/uL


(0.0-0.2) 


 


 


Sodium Level


  140 mmol/L


(136-145) 


 


 


Potassium Level


  3.9 mmol/L


(3.5-5.1) 


 


 


Chloride Level


  105 mmol/L


() 


 


 


Carbon Dioxide Level


  30 mmol/L


(21-32) 


 


 


Anion Gap 5 (6-14)  L 


 


Blood Urea Nitrogen


  18 mg/dL


(7-20) 


 


 


Creatinine


  1.1 mg/dL


(0.6-1.0)  H 


 


 


Estimated GFR


(Cockcroft-Gault) 59.4  


  


 


 


Glucose Level


  139 mg/dL


(70-99)  H 


 


 


Calcium Level


  8.4 mg/dL


(8.5-10.1)  L 


 


 


Troponin I Quantitative


  < 0.017 ng/mL


(0.000-0.055) 


 


 


Urine Collection Type  Void  


 


Urine Color  Yellow  


 


Urine Clarity  Clear  


 


Urine pH  5.5  


 


Urine Specific Gravity  1.010  


 


Urine Protein


  


  Negative mg/dL


(NEG-TRACE)


 


Urine Glucose (UA)


  


  Negative mg/dL


(NEG)


 


Urine Ketones (Stick)


  


  Negative mg/dL


(NEG)


 


Urine Blood


  


  Negative (NEG)


 


 


Urine Nitrite


  


  Negative (NEG)


 


 


Urine Bilirubin


  


  Negative (NEG)


 


 


Urine Urobilinogen Dipstick


  


  0.2 mg/dL (0.2


mg/dL)


 


Urine Leukocyte Esterase


  


  Negative (NEG)


 


 


Urine RBC  0 /HPF (0-2)  


 


Urine WBC


  


  1-4 /HPF (0-4)


 


 


Urine Squamous Epithelial


Cells 


  Few /LPF  


 


 


Urine Bacteria


  


  Few /HPF


(0-FEW)


 


Urine Mucus  Slight /LPF  





 Laboratory Tests


8/4/17 21:45








 Laboratory Tests


8/4/17 21:45














EKG


EKG


2147: EKG shows normal sinus rhythm rate of 76 no STEMI []





Radiology/Procedures


Radiology/Procedures


CT scan of the head is an NAD []





Course & Med Decision Making


Course & Med Decision Making


Pertinent Labs and Imaging studies reviewed. (See chart for details)





ED course:


Patient was seen and examined emergency room CBC, CMP, troponin, UA, CT scan of 

the head without contrast, EKG were ordered


2140: Orthostatic blood pressures were taken lying down pulse is 76, 159/71, 

seating pulse of 78, 154/73, standing pulse of 94, 193/88


2319: On reexamination the patient asymptomatic and feels much better and is 

ready go home. Patient states the dizziness has resolved. Patient states she'll 

follow-up with PCP next one to 2 days.





MDM:


After reviewing the chart, CC/HPI/PMH, physical exam, [lab results], [

radiological results], I do not believe the patient has an acute intracranial 

process warranting further workup and/or admission at this time. Since the 

patient's dizziness is episodic and positional I have low suspicion for central 

lesion versus a peripheral inner ear problem. On reexamination the patient's 

symptoms have resolved and she is ready go home. Recommended short-term follow-

up with PCP next one to 2 days. Additional verbal discharge instructions were 

provided to the patient and that if symptoms get worse or any new symptoms 

arise that are worrisome to the patient she is to return to the emergency room 

immediately


 





[]





Dragon Disclaimer


Dragon Disclaimer


This electronic medical record was generated, in whole or in part, using a 

voice recognition dictation system.





Departure


Departure


Impression:  


 Primary Impression:  


 Vertigo


 Additional Impression:  


 Dizziness


Disposition:  01 HOME, SELF-CARE


Condition:  IMPROVED


Referrals:  


RICHARD DEAL DO (PCP)


Patient Instructions:  Dizziness, Easy-to-Read





Additional Instructions:  


Please follow up with her family doctor next one to 2 days and return symptoms 

increase


Scripts


Meclizine Hcl (MECLIZINE HCL) 25 Mg Tablet


1 TAB PO PRN TID for 5 Days, #15 TAB


   Prov: MUSTAPHA PHAM DO         8/4/17 


Ondansetron (ZOFRAN ODT) 4 Mg Tab.rapdis


4 MG PO TID Y for NAUSEA/VOMITING for 5 Days, #15 TAB


   Prov: MUSTAPHA PHAM DO         8/4/17





Problem Qualifiers











MUSTAPHA PHAM DO Aug 4, 2017 21:58

## 2017-08-04 NOTE — RAD
EXAM: CT head without contrast

 

HISTORY: Patient with dizziness earlier tonight when overheated.

 

COMPARISON: February 5, 2016

 

TECHNIQUE: Computed tomographic images of the head were obtained without 

contrast.

 

RS compliance statement:  One or more of the following individualized 

dose reduction techniques were utilized for this examination:  1. 

Automated exposure control  2. Adjustment of the mA and/or kV according to

patient size  3. Use of iterative reconstruction technique

 

FINDINGS: There is no acute intracranial process identified. Specifically,

there are no intracranial blood products, extra-axial fluid collections, 

mass effect or midline shift.  Ventricles and sulci appear 

age-appropriate.

 

The visualized portions of the orbits, paranasal sinuses and mastoid air 

cells are unremarkable. No suspicious calvarial lesion is seen.

 

IMPRESSION: 

No acute intracranial findings.

 

Electronically signed by: Tory Poole MD (8/4/2017 10:20 PM) [facility]

## 2017-08-05 NOTE — EKG
[facility]

                    [address] [place] [address]

Test Date:    2017               Test Time:    21:31:09

Pat Name:     TERESA MAXWELL               Department:   

Patient ID:   [id number]           Room:          

Gender:       F                        Technician:   

:          [id number]               Requested By: MUSTAPHA PHAM

Order Number: [id number]            Reading MD:   James Wasserman

                                 Measurements

Intervals                              Axis          

Rate:         76                       P:            28

NC:           192                      QRS:          22

QRSD:         82                       T:            26

QT:           376                                    

QTc:          422                                    

                           Interpretive Statements

SINUS RHYTHM

NORMAL ECG

RI6.01

Compared to ECG 2017 23:13:36

Atrial abnormality no longer present



Electronically Signed On 2017 15:07:06 CDT by James Wasserman

## 2017-08-24 ENCOUNTER — HOSPITAL ENCOUNTER (OUTPATIENT)
Dept: HOSPITAL 61 - KCIC MAMMO | Age: 72
Discharge: HOME | End: 2017-08-24
Attending: FAMILY MEDICINE
Payer: COMMERCIAL

## 2017-08-24 DIAGNOSIS — Z12.31: Primary | ICD-10-CM

## 2017-08-24 NOTE — KCIC
Bilateral digital screening mammograms:

 

Reason for examination: Routine screening.

 

Comparison is made to previous studies dated 6/7/2016 and 5/27/2015.

 

The skin and nipples show no abnormalities. No abnormal lymph nodes are 

seen. The breast parenchyma is predominantly fatty. (Breast density: 

Category A.) There are small nodules consistent with intramammary lymph 

nodes bilaterally which have not changed. There are no new dominant 

masses, suspicious calcifications or architectural distortions.

 

Impression:

 

No evidence of malignancy. Recommend routine screening.

 

BI-RADS Category 2: Benign.

 

"Our facility is accredited by the [employer] 

Mammography Program."

 

This patient's information has been entered into a reminder system for the

patient to be notified with the results of her examination and a target 

date for the next mammogram.

 

Electronically signed by: Ophelia Quigley MD (8/24/2017 8:47 AM) [facility]

## 2017-12-25 ENCOUNTER — HOSPITAL ENCOUNTER (EMERGENCY)
Dept: HOSPITAL 61 - ER | Age: 72
Discharge: HOME | End: 2017-12-25
Payer: COMMERCIAL

## 2017-12-25 DIAGNOSIS — M25.512: ICD-10-CM

## 2017-12-25 DIAGNOSIS — J45.901: Primary | ICD-10-CM

## 2017-12-25 DIAGNOSIS — Z88.6: ICD-10-CM

## 2017-12-25 DIAGNOSIS — Z88.8: ICD-10-CM

## 2017-12-25 DIAGNOSIS — I10: ICD-10-CM

## 2017-12-25 DIAGNOSIS — Z88.5: ICD-10-CM

## 2017-12-25 DIAGNOSIS — Z88.1: ICD-10-CM

## 2017-12-25 DIAGNOSIS — M79.602: ICD-10-CM

## 2017-12-25 LAB
ADD MAN DIFF?: NO
ALBUMIN SERPL-MCNC: 3.5 G/DL (ref 3.4–5)
ALBUMIN/GLOB SERPL: 1 {RATIO} (ref 1–1.7)
ALP SERPL-CCNC: 74 U/L (ref 46–116)
ALT (SGPT): 30 U/L (ref 14–59)
ANION GAP SERPL CALC-SCNC: 9 MMOL/L (ref 6–14)
AST SERPL-CCNC: 27 U/L (ref 15–37)
BASO #: 0.1 X10^3/UL (ref 0–0.2)
BASO %: 1 % (ref 0–3)
BLOOD UREA NITROGEN: 17 MG/DL (ref 7–20)
BUN/CREAT SERPL: 21 (ref 6–20)
CALCIUM: 8.3 MG/DL (ref 8.5–10.1)
CHLORIDE: 107 MMOL/L (ref 98–107)
CK SERPL-CCNC: 171 U/L (ref 26–192)
CKMB INDEX: 1 % (ref 0–4)
CKMB MASS: 1.7 NG/ML (ref 0–3.6)
CO2 SERPL-SCNC: 29 MMOL/L (ref 21–32)
CREAT SERPL-MCNC: 0.8 MG/DL (ref 0.6–1)
EOS %: 9 % (ref 0–3)
GFR SERPLBLD BASED ON 1.73 SQ M-ARVRAT: 85.8 ML/MIN
GLOBULIN SER-MCNC: 3.5 G/DL (ref 2.2–3.8)
GLUCOSE SERPL-MCNC: 101 MG/DL (ref 70–99)
HCG SERPL-ACNC: 7.3 X10^3/UL (ref 4–11)
HEMATOCRIT: 37.9 % (ref 36–47)
HEMOGLOBIN: 12.6 G/DL (ref 12–15.5)
LYMPH #: 2.4 X10^3/UL (ref 1–4.8)
LYMPH %: 34 % (ref 24–48)
MEAN CORPUSCULAR HEMOGLOBIN: 31 PG (ref 25–35)
MEAN CORPUSCULAR HGB CONC: 33 G/DL (ref 31–37)
MEAN CORPUSCULAR VOLUME: 93 FL (ref 79–100)
MONO %: 8 % (ref 0–9)
NEUT %: 48 % (ref 31–73)
NT-PRO BNP: 114 PG/ML (ref 0–124)
PLATELET COUNT: 353 X10^3/UL (ref 140–400)
POTASSIUM SERPL-SCNC: 4 MMOL/L (ref 3.5–5.1)
RED BLOOD COUNT: 4.06 X10^6/UL (ref 3.5–5.4)
RED CELL DISTRIBUTION WIDTH: 15.1 % (ref 11.5–14.5)
SODIUM: 145 MMOL/L (ref 136–145)
TOTAL BILIRUBIN: 0.4 MG/DL (ref 0.2–1)
TOTAL PROTEIN: 7 G/DL (ref 6.4–8.2)
TROPONINI: < 0.017 NG/ML (ref 0–0.06)

## 2017-12-25 PROCEDURE — 94640 AIRWAY INHALATION TREATMENT: CPT

## 2017-12-25 PROCEDURE — 82553 CREATINE MB FRACTION: CPT

## 2017-12-25 PROCEDURE — 96374 THER/PROPH/DIAG INJ IV PUSH: CPT

## 2017-12-25 PROCEDURE — 85025 COMPLETE CBC W/AUTO DIFF WBC: CPT

## 2017-12-25 PROCEDURE — 80053 COMPREHEN METABOLIC PANEL: CPT

## 2017-12-25 PROCEDURE — 36415 COLL VENOUS BLD VENIPUNCTURE: CPT

## 2017-12-25 PROCEDURE — 93005 ELECTROCARDIOGRAM TRACING: CPT

## 2017-12-25 PROCEDURE — 83880 ASSAY OF NATRIURETIC PEPTIDE: CPT

## 2017-12-25 PROCEDURE — 99285 EMERGENCY DEPT VISIT HI MDM: CPT

## 2017-12-25 PROCEDURE — 84484 ASSAY OF TROPONIN QUANT: CPT

## 2017-12-25 PROCEDURE — 71010: CPT

## 2017-12-25 RX ADMIN — METHYLPREDNISOLONE SODIUM SUCCINATE 1 MG: 125 INJECTION, POWDER, FOR SOLUTION INTRAMUSCULAR; INTRAVENOUS at 10:24

## 2018-03-13 ENCOUNTER — HOSPITAL ENCOUNTER (OUTPATIENT)
Dept: HOSPITAL 61 - KCIC MRI | Age: 73
Discharge: HOME | End: 2018-03-13
Attending: PHYSICAL MEDICINE & REHABILITATION
Payer: COMMERCIAL

## 2018-03-13 DIAGNOSIS — G89.29: ICD-10-CM

## 2018-03-13 DIAGNOSIS — M48.061: ICD-10-CM

## 2018-03-13 DIAGNOSIS — M51.16: Primary | ICD-10-CM

## 2018-03-13 PROCEDURE — 72148 MRI LUMBAR SPINE W/O DYE: CPT

## 2018-04-11 ENCOUNTER — HOSPITAL ENCOUNTER (EMERGENCY)
Dept: HOSPITAL 61 - ER | Age: 73
Discharge: HOME | End: 2018-04-11
Payer: COMMERCIAL

## 2018-04-11 DIAGNOSIS — S80.11XA: Primary | ICD-10-CM

## 2018-04-11 DIAGNOSIS — Z98.51: ICD-10-CM

## 2018-04-11 DIAGNOSIS — Z88.8: ICD-10-CM

## 2018-04-11 DIAGNOSIS — Z88.6: ICD-10-CM
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
## 2018-04-11 DIAGNOSIS — J45.909: ICD-10-CM

## 2018-04-11 DIAGNOSIS — Z88.1: ICD-10-CM

## 2018-04-11 DIAGNOSIS — Z88.5: ICD-10-CM
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
## 2018-04-11 DIAGNOSIS — Z98.84: ICD-10-CM
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
## 2018-04-11 DIAGNOSIS — I10: ICD-10-CM

## 2018-04-11 PROCEDURE — 99284 EMERGENCY DEPT VISIT MOD MDM: CPT

## 2018-08-23 VITALS — SYSTOLIC BLOOD PRESSURE: 163 MMHG | DIASTOLIC BLOOD PRESSURE: 71 MMHG

## 2018-12-12 ENCOUNTER — HOSPITAL ENCOUNTER (OUTPATIENT)
Dept: HOSPITAL 61 - KCIC MAMMO | Age: 73
Discharge: HOME | End: 2018-12-12
Attending: FAMILY MEDICINE
Payer: COMMERCIAL

## 2018-12-12 DIAGNOSIS — Z12.31: Primary | ICD-10-CM

## 2018-12-12 PROCEDURE — 77067 SCR MAMMO BI INCL CAD: CPT

## 2018-12-12 NOTE — KCIC
Bilateral digital screening mammograms:

 

Reason for examination: Routine screening.

 

Comparison is made to previous studies dated 8/24/2017 and 6/7/2016.

 

Interpretation is made with the benefit of CAD.

 

The skin and nipples show no abnormalities. No abnormal lymph nodes are 

seen. The breast parenchyma is predominantly fatty. (Breast density: 

Category A.) There continues to be small nodules consistent with 

intramammary lymph nodes in the breasts bilaterally. There are no new 

dominant masses, suspicious calcifications or architectural distortions.

 

Impression:

 

No evidence of malignancy. Recommend routine screening.

 

BI-RADS Category 2: Benign.

 

"Our facility is accredited by the American [employer] 

Mammography Program."

 

This patient's information has been entered into a reminder system for the

patient to be notified with the results of her examination and a target 

date for the next mammogram.

 

Electronically signed by: Ophelia Quigley MD (12/12/2018 5:29 PM) [facility]

## 2019-01-13 ENCOUNTER — HOSPITAL ENCOUNTER (EMERGENCY)
Dept: HOSPITAL 61 - ER | Age: 74
Discharge: HOME | End: 2019-01-13
Payer: COMMERCIAL

## 2019-01-13 VITALS — WEIGHT: 223 LBS | BODY MASS INDEX: 42.1 KG/M2 | HEIGHT: 61 IN

## 2019-01-13 VITALS — SYSTOLIC BLOOD PRESSURE: 166 MMHG | DIASTOLIC BLOOD PRESSURE: 83 MMHG

## 2019-01-13 DIAGNOSIS — Z88.8: ICD-10-CM

## 2019-01-13 DIAGNOSIS — Z88.5: ICD-10-CM

## 2019-01-13 DIAGNOSIS — Z88.1: ICD-10-CM

## 2019-01-13 DIAGNOSIS — J45.909: ICD-10-CM

## 2019-01-13 DIAGNOSIS — M25.561: ICD-10-CM

## 2019-01-13 DIAGNOSIS — I10: ICD-10-CM

## 2019-01-13 DIAGNOSIS — M11.261: ICD-10-CM

## 2019-01-13 DIAGNOSIS — M25.461: Primary | ICD-10-CM

## 2019-01-13 PROCEDURE — 73562 X-RAY EXAM OF KNEE 3: CPT

## 2019-01-13 PROCEDURE — 99284 EMERGENCY DEPT VISIT MOD MDM: CPT

## 2019-01-13 PROCEDURE — 96372 THER/PROPH/DIAG INJ SC/IM: CPT

## 2019-01-13 NOTE — PHYS DOC
Past Medical History


Past Medical History:  Asthma, Hypertension


Additional Past Medical Histor:  Pericarditis, lichens planus,SCIATICA, BACK 

PAIN/STENOSIS


Past Surgical History:  Tonsillectomy, Tubal ligation


Additional Past Surgical Histo:  Gastric Bypass, fistula removal, breast glands 

removed (49 years ago).


Alcohol Use:  None


Drug Use:  None





Adult General


Chief Complaint


Chief Complaint:  LOWER EXTREMITY SWELLING





HPI


HPI





Patient is a 73  year old female with a history of hypertension, who presents 

today complaining of moderate pain to the right knee that began yesterday. 

Patient denies any injury. She states the knee feels swollen. Patient states 

the pain is worse on weight-bearing and sometimes radiates to the right lower 

extremity. Denies any numbness or tingling to bilateral lower extremities. She 

states she has history of sciatica which is getting worse due to this pain in 

her knee. She states she took ibuprofen 800 mg with no relief. She is 

requesting injections to her knee.





Review of Systems


Review of Systems





Constitutional: Denies fever or chills []]


Musculoskeletal: Reports right knee pain.


Integument: Denies rash or skin lesions []


Neurologic: Denies headache, focal weakness or sensory changes []








All other systems were reviewed and found to be within normal limits, except as 

documented in this note.





Current Medications


Current Medications





Current Medications








 Medications


  (Trade)  Dose


 Ordered  Sig/Hugh  Start Time


 Stop Time Status Last Admin


Dose Admin


 


 Acetaminophen/


 Hydrocodone Bitart


  (Lortab 5/325)  2 tab  1X  ONCE  1/13/19 13:30


 1/13/19 13:32 DC 1/13/19 13:41


2 TAB


 


 Methylprednisolone


 Sodium Succinate


  (SOLU-Medrol


 125MG VIAL)  125 mg  1X  ONCE  1/13/19 12:30


 1/13/19 12:31 DC 1/13/19 12:36


125 MG











Allergies


Allergies





Allergies








Coded Allergies Type Severity Reaction Last Updated Verified


 


  Trimethobenzamide HCl Allergy Intermediate  12/25/17 Yes


 


  amoxicillin Allergy Intermediate  8/23/18 Yes


 


  clavulanic acid Allergy Intermediate  8/23/18 Yes


 


  codeine Allergy Intermediate Tolerates Morphine 12/25/17 Yes


 


  dichloralphenazone Allergy Intermediate  12/25/17 Yes


 


  isometheptene mucate Allergy Intermediate  12/25/17 Yes


 


  levofloxacin Allergy Intermediate  12/25/17 Yes


 


  metoprolol Allergy Intermediate Rash 12/25/17 Yes











Physical Exam


Physical Exam





Constitutional: Well developed, well nourished, no acute distress, non-toxic 

appearance. []


Skin: Warm, dry, no erythema, no rash. [] 


Back: No tenderness, no CVA tenderness. [] 


Extremities: Right knee with no obvious deformity. No tenderness on palpation 

of the right knee, full range of motion to the right knee. Negative Homans sign 

to the right lower extremity. Negative Lachman sign, negative Ace sign, 

negative anterior-posterior drawer sign to the right knee. +2 right pedal 

pulse. Cap 2 seconds the right toes. Sensation intact to the right lower 

extremity.


Neurologic: Alert and oriented X 3, normal motor function, normal sensory 

function, no focal deficits noted. []


Psychologic: Affect normal, judgement normal, mood normal. []





Current Patient Data


Vital Signs





 Vital Signs








  Date Time  Temp Pulse Resp B/P (MAP) Pulse Ox O2 Delivery O2 Flow Rate FiO2


 


1/13/19 12:08 98.2 85 18 166/83 (110) 98 Room Air  





 98.2       











EKG


EKG


[]





Radiology/Procedures


Radiology/Procedures


[]PROCEDURE: KNEE RIGHT 3V





 


KNEE RIGHT 3V


 


Clinical Indication: RIGHT KNEE PAIN, NO KNOWN INJURY


 


Comparison: None.


 


Findings: 


Patella in anatomic position. There is medial compartment narrowing. There


is probable joint effusion. There is meniscal chondrocalcinosis. No acute 


fracture. No soft tissue swelling.


 


IMPRESSION:


1.  No acute fracture.


2.  Meniscal chondrocalcinosis.


3.  Probable joint effusion.


 


Electronically signed by: Dino Helm MD (1/13/2019 1:00 PM) [facility]














DICTATED and SIGNED BY:     DINO HELM MD


DATE:     01/13/19 [facility]





Course & Med Decision Making


Course & Med Decision Making


Pertinent Labs and Imaging studies reviewed. (See chart for details)





This is a 73-year-old female patient presenting to the ED today with right knee 

pain since yesterday, no injury. Right knee x-rays interpreted by radiologist 

were negative for any acute findings, noted for small probable joint effusion. 

Ace bandage applied to the right knee by the ED RN, neurovascular exam is 

intact. Ice elevation encouraged. Discharged with Medrol Dosepak. Follow-up 

with orthopedic doctor in 1-2 weeks.








Staff Physician Addendum:


I was working in the ER during the course of this patient's visit.  I was 

available for consultation as needed, but I was not directly involved in the 

care of this patient.





Dragon Disclaimer


Dragon Disclaimer


This electronic medical record was generated, in whole or in part, using a 

voice recognition dictation system.





Departure


Departure


Impression:  


 Primary Impression:  


 Effusion of knee joint right


 Additional Impression:  


 Right knee pain


Disposition:  01 HOME, SELF-CARE


Condition:  STABLE


Referrals:  


RICHARD DEAL DO (PCP)








LUCITA LAND MD


Follow-up in 1-2 weeks


Patient Instructions:  Knee Pain, Easy-to-Read





Additional Instructions:  


You were evaluated in the emergency room for right knee pain, you were noted to 

have a small possible joint effusion/fluid in the knee. Use the Ace bandage 

provided. Ice elevate the extremity. Follow-up with primary care doctor or the 

orthopedic doctor provided in 1-2 weeks. Take the prescribed medications as 

ordered.


Scripts


Methylprednisolone (MEDROL) 4 Mg Tab.ds.pk


1 PKG PO UD, #1 PKG


   Prov: GRICEL GODINEZ GURDEEP         1/13/19





Problem Qualifiers








 Additional Impression:  


 Right knee pain


 Chronicity:  acute  Qualified Codes:  M25.561 - Pain in right knee








GRICEL GODINEZ APRN Jan 13, 2019 12:38


ROEL DONG MD Jan 13, 2019 17:42

## 2019-01-13 NOTE — RAD
KNEE RIGHT 3V

 

Clinical Indication: RIGHT KNEE PAIN, NO KNOWN INJURY

 

Comparison: None.

 

Findings: 

Patella in anatomic position. There is medial compartment narrowing. There

is probable joint effusion. There is meniscal chondrocalcinosis. No acute 

fracture. No soft tissue swelling.

 

IMPRESSION:

1.  No acute fracture.

2.  Meniscal chondrocalcinosis.

3.  Probable joint effusion.

 

Electronically signed by: Dino Helm MD (1/13/2019 1:00 PM) [facility]

## 2019-03-08 ENCOUNTER — HOSPITAL ENCOUNTER (EMERGENCY)
Dept: HOSPITAL 61 - ER | Age: 74
Discharge: HOME | End: 2019-03-08
Payer: COMMERCIAL

## 2019-03-08 VITALS — SYSTOLIC BLOOD PRESSURE: 134 MMHG | DIASTOLIC BLOOD PRESSURE: 61 MMHG

## 2019-03-08 VITALS — WEIGHT: 227 LBS | BODY MASS INDEX: 42.86 KG/M2 | HEIGHT: 61 IN

## 2019-03-08 DIAGNOSIS — Z88.5: ICD-10-CM

## 2019-03-08 DIAGNOSIS — Z88.8: ICD-10-CM

## 2019-03-08 DIAGNOSIS — J20.9: Primary | ICD-10-CM

## 2019-03-08 DIAGNOSIS — Z88.1: ICD-10-CM

## 2019-03-08 DIAGNOSIS — J45.909: ICD-10-CM

## 2019-03-08 DIAGNOSIS — Z98.51: ICD-10-CM

## 2019-03-08 DIAGNOSIS — I10: ICD-10-CM

## 2019-03-08 DIAGNOSIS — Z90.89: ICD-10-CM

## 2019-03-08 PROCEDURE — 71046 X-RAY EXAM CHEST 2 VIEWS: CPT

## 2019-03-08 PROCEDURE — 99283 EMERGENCY DEPT VISIT LOW MDM: CPT

## 2019-03-08 PROCEDURE — 94640 AIRWAY INHALATION TREATMENT: CPT

## 2019-03-08 NOTE — PHYS DOC
Past Medical History


Past Medical History:  Asthma, Hypertension


Additional Past Medical Histor:  Pericarditis, lichens planus,SCIATICA, BACK 

PAIN/STENOSIS


 (CAROLINA MENCHACA)


Past Surgical History:  Tonsillectomy, Tubal ligation


Additional Past Surgical Histo:  Gastric Bypass, fistula removal, breast glands 

removed (49 years ago).


 (CAROLINA MENCHACA)


Alcohol Use:  None


Drug Use:  None


 (CAROLINA MENCHACA)





Adult General


Chief Complaint


Chief Complaint:  ASTHMA





HPI


HPI





Patient is a 73  year old female who presents with exacerbation of her 

asthma.She states her home medications are not working and she feels like her 

wheezing is worsening.


 (CAROLINA MENCHACA)





Review of Systems


Review of Systems





Constitutional: Denies fever or chills []


Eyes: Denies change in visual acuity, redness, or eye pain []


HENT: Denies nasal congestion or sore throat []


Respiratory: Denies cough or shortness of breath []


Cardiovascular: No additional information not addressed in HPI []


GI: Denies abdominal pain, nausea, vomiting, bloody stools or diarrhea []


: Denies dysuria or hematuria []


Musculoskeletal: Denies back pain or joint pain []


Integument: Denies rash or skin lesions []


Neurologic: Denies headache, focal weakness or sensory changes []


Endocrine: Denies polyuria or polydipsia []





All other systems were reviewed and found to be within normal limits, except as 

documented in this note.


 (CAROLINA MENCHACA)





Current Medications


Current Medications





Current Medications








 Medications


  (Trade)  Dose


 Ordered  Sig/Hugh  Start Time


 Stop Time Status Last Admin


Dose Admin


 


 Albuterol/


 Ipratropium


  (Duoneb)  3 ml  1X  ONCE  3/8/19 17:30


 3/8/19 17:31 DC 3/8/19 17:38


3 ML


 


 Prednisone


  (Prednisone)  50 mg  1X  ONCE  3/8/19 17:30


 3/8/19 17:31 DC 3/8/19 18:05


50 MG





 (ROEL DONG MD)





Allergies


Allergies





Allergies








Coded Allergies Type Severity Reaction Last Updated Verified


 


  Trimethobenzamide HCl Allergy Intermediate  17 Yes


 


  amoxicillin Allergy Intermediate  18 Yes


 


  clavulanic acid Allergy Intermediate  18 Yes


 


  codeine Allergy Intermediate Tolerates Morphine 17 Yes


 


  dichloralphenazone Allergy Intermediate  17 Yes


 


  isometheptene mucate Allergy Intermediate  17 Yes


 


  levofloxacin Allergy Intermediate  17 Yes


 


  metoprolol Allergy Intermediate Rash 17 Yes





 (ROEL DONG MD)





Physical Exam


Physical Exam





Constitutional: Well developed, well nourished, no acute distress, non-toxic 

appearance. []


HENT: Normocephalic, atraumatic, bilateral external ears normal, oropharynx 

moist, no oral exudates, nose normal. []


Eyes: PERRLA, EOMI, conjunctiva normal, no discharge. [] 


Neck: Normal range of motion, no tenderness, supple, no stridor. [] 


Cardiovascular:Heart rate regular rhythm, no murmur []


Lungs & Thorax:  Bilateral breath sounds wheezing and rhonchi


Abdomen: Bowel sounds normal, soft, no tenderness, no masses, no pulsatile 

masses. [] 


Skin: Warm, dry, no erythema, no rash. [] 


Back: No tenderness, no CVA tenderness. [] 


Extremities: No tenderness, no cyanosis, no clubbing, ROM intact, no edema. [] 


Neurologic: Alert and oriented X 3, normal motor function, normal sensory 

function, no focal deficits noted. []


Psychologic: Affect normal, judgement normal, mood normal. []


 (CAROLINA MENCHACA)





EKG


EKG


[]


 (CAROLINA MENCHACA)





Radiology/Procedures


Radiology/Procedures


[][facility]


                    [address] [place] [address]


                                 [phone]


                                        


                                 IMAGING REPORT





                                     Signed





PATIENT: TERESA MAXWELL     ACCOUNT: [id number]     MRN#: [id number]


: 1945           LOCATION: ER              AGE: 73


SEX: F                    EXAM DT: 19         ACCESSION#: [id number]


STATUS: DEP ER            ORD. PHYSICIAN: CAROLINA MENCHACA   


REASON: coughing x 1 week


PROCEDURE: CHEST PA & LATERAL





PA and lateral chest.


 


HISTORY: Coughing x1 week


 


PA and lateral views were taken of the chest. Heart is normal in size. 


There is no effusion. There is linear atelectasis in the left lung. There 


are no other infiltrates.


 


IMPRESSION:


1. Mild linear atelectasis without other infiltrates.


 


Electronically signed by: Jonathan Vaughn MD (3/8/2019 10:20 PM) [facility]














DICTATED and SIGNED BY:     JONATHAN VAUGHN MD


DATE:     19


 (CAROLINA MENCHACA)





Course & Med Decision Making


Course & Med Decision Making


Pertinent Labs and Imaging studies reviewed. (See chart for details)





[]The patient was given prednisone and a nebulizer treatment. She states she is 

feeling better.


 (CAROLINA MENCHACA)


Course & Med Decision Making





Staff Physician Addendum:


I was working in the ER during the course of this patient's visit.  I was 

available for consultation as needed, but I was not directly involved in the 

care of this patient.    


 (ROEL DONG MD)


Dragon Disclaimer


Dragon Disclaimer


This electronic medical record was generated, in whole or in part, using a voice

 recognition dictation system.


 (CAROLINA MENCHACA)





Departure


Departure


Impression:  


   Primary Impression:  


   Acute bronchitis


Disposition:   HOME, SELF-CARE


Condition:  STABLE


Referrals:  


RICHARD DEAL DO (PCP)


Patient Instructions:  Acute Bronchitis





Additional Instructions:  


Take the medications as directed. Continue to use your asthma medications at 

home. Follow up with your primary care provider in 3 days if not improving or 

return to the emergency department if worsening.


Scripts


Prednisone (PREDNISONE) 50 Mg Tablet


1 TAB PO DAILY for asthma, #5 TAB


   Prov: CAROLINA MENCHACA         3/8/19 


Azithromycin (ZITHROMAX) 250 Mg Tablet


1 PKG PO UD for bronchitis, #1 PKG


   Prov: CAROLINA MENCHACA         3/8/19











CAROLINA MENCHACA           Mar 8, 2019 19:23


ROEL DONG MD             2019 18:11

## 2019-03-08 NOTE — RAD
PA and lateral chest.

 

HISTORY: Coughing x1 week

 

PA and lateral views were taken of the chest. Heart is normal in size. 

There is no effusion. There is linear atelectasis in the left lung. There 

are no other infiltrates.

 

IMPRESSION:

1. Mild linear atelectasis without other infiltrates.

 

Electronically signed by: Jonathan Vaughn MD (3/8/2019 10:20 PM) [facility]

## 2019-06-02 ENCOUNTER — HOSPITAL ENCOUNTER (EMERGENCY)
Dept: HOSPITAL 61 - ER | Age: 74
Discharge: HOME | End: 2019-06-02
Payer: COMMERCIAL

## 2019-06-02 VITALS — SYSTOLIC BLOOD PRESSURE: 138 MMHG | DIASTOLIC BLOOD PRESSURE: 60 MMHG

## 2019-06-02 VITALS — WEIGHT: 213 LBS | HEIGHT: 61 IN | BODY MASS INDEX: 40.22 KG/M2

## 2019-06-02 DIAGNOSIS — Z90.89: ICD-10-CM

## 2019-06-02 DIAGNOSIS — Z88.1: ICD-10-CM

## 2019-06-02 DIAGNOSIS — J45.909: ICD-10-CM

## 2019-06-02 DIAGNOSIS — Z88.8: ICD-10-CM

## 2019-06-02 DIAGNOSIS — I10: ICD-10-CM

## 2019-06-02 DIAGNOSIS — Z88.5: ICD-10-CM

## 2019-06-02 DIAGNOSIS — K21.0: Primary | ICD-10-CM

## 2019-06-02 DIAGNOSIS — Z98.51: ICD-10-CM

## 2019-06-02 PROCEDURE — 99283 EMERGENCY DEPT VISIT LOW MDM: CPT

## 2019-06-02 NOTE — PHYS DOC
Past Medical History


Past Medical History:  Asthma, Hypertension


Additional Past Medical Histor:  Pericarditis, lichens planus,SCIATICA, BACK 

PAIN/STENOSIS


 (PEDRITO JAEGER)


Past Surgical History:  Tonsillectomy, Tubal ligation


Additional Past Surgical Histo:  Gastric Bypass, fistula removal, breast glands 

removed (49 years ago).


 (PEDRITO JAEGER)


Additional Information:  


non smoker


Alcohol Use:  None


Drug Use:  None


 (PEDRITO JAEGER)





Adult General


Chief Complaint


Chief Complaint:  DIFFICULTY SWALLOWING





HPI


HPI





Patient is a 73 year old female who presents with multiple complaints. Her 

primary complaint today is she's been having a sore throat and painful 

swallowing since yesterday. Denies any nausea or vomiting. This got worse after 

a chili dog at lunch. Describes the pain as raw and rates the pain 6 out of 10. 

Has tried Tylenol and Ibuprofen at home with some success. Also states that she 

had an episode of tingling in her L arm a couple of weeks ago when she woke up. 

States that she was sleeping with left arm behind her head. Denies any other 

neurological symptoms.


 (PEDRITO JAEGER)





Review of Systems


Review of Systems





Constitutional: Denies fever or chills []


Eyes: Denies change in visual acuity, redness, or eye pain []


HENT: Denies nasal congestion but reports sore throat and pain when swallowing.


Respiratory: Denies cough or shortness of breath []


Cardiovascular: No additional information not addressed in HPI []


GI: Denies abdominal pain, nausea, vomiting, bloody stools or diarrhea []


: Denies dysuria or hematuria []


Musculoskeletal: Denies back pain or joint pain []


Integument: Denies rash or skin lesions []


Neurologic: Denies headache, focal weakness or sensory changes with exception of

one episode of tingling in hand after waking up several weeks ago. []


Endocrine: Denies polyuria or polydipsia []





Complete systems were reviewed and found to be within normal limits, except as 

documented in this note.


 (PEDRITO JAEGER)





Current Medications


Current Medications





Current Medications








 Medications


  (Trade)  Dose


 Ordered  Sig/Hugh  Start Time


 Stop Time Status Last Admin


Dose Admin


 


 Multi-Ingredient


 Mouthwash/Gargle


  (Gi Cocktail)  20 ml  1X  ONCE  6/2/19 19:15


 6/2/19 19:16 DC 6/2/19 19:20


20 ML


 


 Pantoprazole


 Sodium


  (Protonix)  40 mg  1X  ONCE  6/2/19 19:30


 6/2/19 19:31 DC 6/2/19 19:33


40 MG





 (ROEL DONG MD)





Allergies


Allergies





Allergies








Coded Allergies Type Severity Reaction Last Updated Verified


 


  Trimethobenzamide HCl Allergy Intermediate  12/25/17 Yes


 


  amoxicillin Allergy Intermediate  8/23/18 Yes


 


  clavulanic acid Allergy Intermediate  8/23/18 Yes


 


  codeine Allergy Intermediate Tolerates Morphine 12/25/17 Yes


 


  dichloralphenazone Allergy Intermediate  12/25/17 Yes


 


  isometheptene mucate Allergy Intermediate  12/25/17 Yes


 


  levofloxacin Allergy Intermediate  12/25/17 Yes


 


  metoprolol Allergy Intermediate Rash 12/25/17 Yes





 (ROEL DONG MD)





Physical Exam


Physical Exam





Constitutional: Well developed, well nourished, no acute distress, non-toxic 

appearance. []


HENT: Normocephalic, atraumatic, bilateral external ears normal, oropharynx 

moist, no oral exudates but has erythema, nose normal. []


Eyes: PERRLA, EOMI, conjunctiva normal, no discharge. [] 


Neck: Normal range of motion, no tenderness, supple, no stridor. [] 


Cardiovascular:Heart rate regular rhythm, no murmur []


Lungs & Thorax:  Bilateral breath sounds clear to auscultation []


Abdomen: Bowel sounds normal, soft, no tenderness, no masses, no pulsatile 

masses. [] 


Skin: Warm, dry, no erythema, no rash. [] 


Back: No tenderness, no CVA tenderness. [] 


Extremities: No tenderness, no cyanosis, no clubbing, ROM intact, no edema. [] 


Neurologic: Alert and oriented X 3, normal motor function, normal sensory 

function, no focal deficits noted. []


Psychologic: Affect normal, judgement normal, mood normal. []





Administer stroke scale items in the order listed.  Record performance in each 

category after each subscale exam.  Do not go back and change scores.  Follow 

directions provided for each exam technique.  Scores should reflect what the 

patient does, not what the clinician thinks the patient can do.  The clinician 

should record answers while administering the exam and work quickly. Except 

where indicated, the patient should not be coached (i.e., repeated requests to 

patient to make a special effort).








1a.    Level  of  Consciousness:  The  investigator  must  choose  a response if

 a full evaluation is prevented by such obstacles as an endotracheal tube, 

language barrier, orotracheal trauma/bandages.  A 3 is scored only if the 

patient makes no movement (other than reflexive posturing) in response to 

noxious stimulation.   





0 =   Alert; keenly responsive.


1 =   Not alert; but arousable by minor stimulation to obey, answer, or respond.


2 =   Not alert; requires repeated stimulation to attend, or is obtunded and 

requires strong or painful stimulation to make movements (not stereotyped).


3 =   Responds only with reflex motor or autonomic effects or totally 

unresponsive, flaccid, and areflexic.       





1b.  LOC Questions:  The patient is asked the month and his/her age. The answer 

must be correct - there is no partial credit for being close. Aphasic and 

stuporous patients who do not comprehend the questions will score 2.   Patients 

unable to speak because of endotracheal intubation, orotracheal trauma, severe 

dysarthria from any cause, language barrier, or any other problem not secondary 

to aphasia are given a 1.  It is important that only the initial answer be 

graded and that the examiner not "help" the patient with verbal or non-verbal 

cues.


   


0 =    Answers both questions correctly.


1 =    Answers one question correctly.


2 =    Answers neither question correctly.   





1c.   LOC Commands:    The patient is asked to open and close the eyes and then 

to  and release the non-paretic hand.   Substitute another one step command 

if the hands cannot be used.   Credit is given if an unequivocal attempt is made

 but not completed due to weakness.   If the patient does not respond to 

command, the task should be demonstrated to him or her (pantomime), and the 

result scored  (i.e.,  follows  none,  one  or  two  commands).    Patients  wi

th trauma, amputation, or other physical impediments should be given suitable 

one-step commands. Only the first attempt is scored.





0 = Performs both tasks correctly.


1 = Performs one task correctly.


2 = Performs neither task correctly.   





2.   Best Gaze:   Only horizontal eye movements will be tested. Voluntary or 

reflexive (oculocephalic) eye movements will be scored, but  caloric  testing  

is  not  done.    If  the  patient  has  a  conjugate deviation of the eyes that

 can be overcome by voluntary or reflexive activity, the score will be 1.   If a

 patient has an isolated peripheral nerve paresis   (CN III, IV or VI), score a 

1.  Gaze is testable in all aphasic patients. Patients with ocular trauma, 

bandages, pre-existing blindness, or other disorder of visual acuity or fields 

should be tested with reflexive movements, and a choice made by the 

investigator. Establishing eye contact and then moving about the patient from 

side to side will occasionally clarify the presence of a partial gaze palsy.


   


0 = Normal.


1 = Partial gaze palsy; gaze is abnormal in one or both eyes, but forced 

deviation or total gaze paresis is not present.


2 = Forced deviation, or total gaze paresis not overcome by the oculocephalic 

maneuver.   











    


 








Interval:   [ ] Baseline   [ ] 2 hours post treatment   [ ] 24 hours post onset 

of symptoms 20 minutes   [ ] 7-10 days


[ ] 3 months   [ ] Other     (        )











3.  Visual:   Visual fields (upper and lower quadrants) are tested by 

confrontation, using finger counting or visual threat, as appropriate. Patients 

may be  encouraged, but if  they look at  the side of  the moving fingers a

ppropriately, this can be scored as normal. If there is unilateral blindness or 

enucleation, visual fields in the remaining eye are scored.   Score 1 only if a 

clear-cut asymmetry, including quadrantanopia, is found.  If patient is blind 

from any cause, score 3. Double simultaneous stimulation is performed at this 

point.  If there is extinction, patient receives a 1, and the results are used 

to respond to item 11.   


0 = No visual loss.





1 = Partial hemianopia.





2 = Complete hemianopia.





3 = Bilateral hemianopia (blind including cortical blindness).   





4.  Facial Palsy:  Ask - or use pantomime to encourage - the patient to show 

teeth or raise eyebrows and close eyes.  Score symmetry of grimace in response 

to noxious stimuli in the poorly responsive or non-comprehending patient.   If 

facial trauma/bandages, orotracheal tube, tape or other physical barriers 

obscure the face, these should be removed to the extent possible.   


0 = Normal symmetrical movements.


1 = Minor paralysis (flattened nasolabial fold, asymmetry on smiling).


2 = Partial paralysis (total or near-total paralysis of lower


face).


3 = Complete paralysis of one or both sides (absence of facial movement in the 

upper and lower face).   











    





5.  Motor Arm:  The limb is placed in the appropriate position: extend the  arms

 (palms  down)  90  degrees  (if  sitting)  or  45  degrees  (if supine).  Drift

is scored if the arm falls before 10 seconds.  The aphasic patient is encouraged

using urgency in the voice and pantomime, but not noxious stimulation.  Each 

limb is tested in turn, beginning with the non-paretic arm. Only in the case of 

amputation or joint fusion at the shoulder, the examiner should record the score

as untestable (UN), and clearly write the explanation for this choice.   


0 = No drift;  limb holds 90 (or 45) degrees for full 10 seconds.


1 = Drift; limb holds 90 (or 45) degrees, but drifts down before full 10 

seconds; does not hit bed or other support.


2 = Some effort against gravity; limb cannot get to or maintain (if cued) 90 (or

45) degrees, drifts down to bed, but has some effort against gravity.


3 = No effort against gravity; limb falls.


4 = No movement.


UN = Amputation or joint fusion, explain:      





5a. Left Arm





5b. Right Arm   





6.  Motor  Leg:   The limb is placed in the appropriate position:  hold the leg 

at 30 degrees (always tested supine).  Drift is scored if the leg falls before 5

seconds.  The aphasic patient is encouraged using urgency in the voice and 

pantomime, but not noxious stimulation. Each limb is tested in turn, beginning 

with the non-paretic leg.  Only in the case of amputation or joint fusion at the

hip, the examiner should record the score as untestable (UN), and clearly write 

the explanation for this choice.   


0 = No drift;  leg holds 30-degree position for full 5 seconds.


1 = Drift; leg falls by the end of the 5-second period but does not hit bed.


2 = Some effort against gravity; leg falls to bed by 5


seconds, but has some effort against gravity.


3 = No effort against gravity; leg falls to bed immediately.


4 = No movement.


UN = Amputation or joint fusion, explain:     





6a. Left Leg








6b. Right Leg   


 








Interval:   [ ] Baseline   [ ] 2 hours post treatment   [ ] 24 hours post onset 

of symptoms 20 minutes   [ ] 7-10 days


[ ] 3 months   [ ] Other     (        )





      





7. Limb Ataxia:  This item is aimed at finding evidence of a unilateral 

cerebellar lesion.   Test with eyes open.   In case of visual defect, ensure 

testing is done in intact visual field.   The finger-nose-finger and heel-shin 

tests are performed on both sides, and ataxia is scored only if present out of 

proportion to weakness.  Ataxia is absent in the patient who cannot understand 

or is paralyzed.  Only in the case of amputation or joint fusion, the examiner 

should record the score as untestable (UN), and clearly write the explanation 

for this choice.  In case  of  blindness,  test  by  having  the  patient  touch

 nose  from extended arm position.   


0 = Absent.





1 = Present in one limb.





2 = Present in two limbs.





UN = Amputation or joint fusion, explain:         





8.   Sensory:    Sensation or grimace to pinprick when tested, or withdrawal 

from noxious stimulus in the obtunded or aphasic patient. Only sensory loss 

attributed to stroke is scored as abnormal and the examiner should test as many 

body areas (arms [not hands], legs, trunk, face) as needed to accurately check 

for hemisensory loss.  A score of 2, "severe or total sensory loss," should only

be given when a severe or total loss of sensation can be clearly demonstrated. 

Stuporous and aphasic patients will, therefore, probably score 1 or 0. The 

patient with brainstem stroke who has bilateral loss of sensation is scored 2.  

If the patient does not respond and is quadriplegic, score


2.  Patients in a coma (item 1a=3) are automatically given a 2 on this


item.   


0 = Normal; no sensory loss.





1 = Mild-to-moderate sensory loss; patient feels pinprick is less sharp or is 

dull on the affected side; or there is a loss of superficial pain with pinprick,

but patient is aware of being touched.





2 = Severe to total sensory loss; patient is not aware of being touched in the 

face, arm, and leg.   





9. Best Language:  A great deal of information about comprehension will be obta

ined during the preceding sections of the examination. For this scale item, the 

patient is asked to describe what is happening in the attached picture, to name 

the items on the attached naming sheet  and  to  read  from  the  attached  list

 of  sentences. Comprehension is judged from responses here, as well as to all 

of the commands in the preceding general neurological exam.  If visual loss 

interferes with the tests, ask the patient to identify objects placed in the 

hand, repeat, and produce speech.   The intubated patient should be asked to 

write. The patient in a coma (item 1a=3) will automatically score 3 on this 

item.   The examiner must choose a score for the patient with stupor or limited 

cooperation, but a score of


3 should be used only if the patient is mute and follows no one-step commands.

   


0 = No aphasia; normal.





1 = Mild-to-moderate aphasia; some obvious loss of fluency or facility of 

comprehension, without significant limitation on ideas expressed or form of 

expression. Reduction of speech and/or comprehension, however, makes 

conversation about provided materials difficult or impossible. For example, in 

conversation about provided materials, examiner can identify picture or naming 

card content from patient's response.





2 = Severe aphasia; all communication is through fragmentary expression; great 

need for inference, questioning, and guessing by the listener. Range of 

information that can be exchanged is limited; listener carries burden of 

communication. Examiner cannot identify materials provided from patient 

response.





3 = Mute, global aphasia; no usable speech or auditory comprehension.   





10.   Dysarthria: If  patient is  thought to  be  normal, an  adequate sample of

speech must be obtained by asking patient to read or repeat  words  from  the  

attached  list.    If  the  patient  has  severe aphasia, the clarity of 

articulation of spontaneous speech can be rated.  Only if the patient is 

intubated or has other physical barriers to producing  speech,  the   examiner  

should  record  the   score  as untestable (UN), and clearly write an 

explanation for this choice.  Do not tell the patient why he or she is being 

tested.   


0 = Normal.


1 = Mild-to-moderate dysarthria; patient slurs at least some words and, at 

worst, can be understood with some difficulty.


2 = Severe dysarthria; patient's speech is so slurred as to be


unintelligible in the absence of or out of proportion to any dysphasia, or is 

mute/anarthric.


UN = Intubated or other physical barrier,


explain:       


 








Interval:   [ ] Baseline   [ ] 2 hours post treatment   [ ] 24 hours post onset 

of symptoms 20 minutes   [ ] 7-10 days


[ ] 3 months   [ ] Other     (        )











 


11.   Extinction and Inattention (formerly Neglect):    Sufficient information  

to  identify  neglect  may  be  obtained  during  the  prior testing.   If the 

patient has a severe visual loss preventing visual double simultaneous 

stimulation, and the cutaneous stimuli are normal, the score is normal.  If the 

patient has aphasia but does appear to attend to both sides, the score is 

normal.  The presence of visual spatial neglect or anosagnosia may also be taken

as evidence of abnormality.  Since the abnormality is scored only if present, 

the item is never untestable.


 


0 = No abnormality.





1 = Visual, tactile, auditory, spatial, or personal inattention


or extinction to bilateral simultaneous stimulation in one       


of the sensory modalities.





2 = Profound miller-inattention or extinction to more than one modality; does not 

recognize own hand or orients to only one side of space.


 (PEDRITO JAEGER)





Current Patient Data


Vital Signs





                                   Vital Signs








  Date Time  Temp Pulse Resp B/P (MAP) Pulse Ox O2 Delivery O2 Flow Rate FiO2


 


6/2/19 19:41  103 16 138/60 (86) 99 Room Air  


 


6/2/19 19:20 98.3       





 98.3       





 (ROEL DONG MD)





EKG


EKG


[]


 (PEDRITO JAEGER)





Radiology/Procedures


Radiology/Procedures


[]


 (PEDRITO JAEGER)





Course & Med Decision Making


Course & Med Decision Making


Pertinent Labs and Imaging studies reviewed. (See chart for details)





Appears to have GERD. Will give GI cocktail to evaluate and then have follow up 

with primary care provider. Patient is agreeable.





GI cocktail improved the pain. Will d/c and have follow up with primary care.


 (PEDRITO JAEGER)


Course & Med Decision Making





Staff Physician Addendum:


I was working in the ER during the course of this patient's visit.  I was 

available for consultation as needed, but I was not directly involved in the 

care of this patient.    


 (ROEL DONG MD)


Dragon Disclaimer


Dragon Disclaimer


This electronic medical record was generated, in whole or in part, using a voice

 recognition dictation system.


 (PEDRITO JAEGER)





Departure


Departure


Impression:  


   Primary Impression:  


   GERD (gastroesophageal reflux disease)


Disposition:  01 HOME, SELF-CARE


Condition:  STABLE


Referrals:  


RICHARD DEAL DO (PCP)


Patient Instructions:  Diet for Gastroesophageal Reflux Disease, Adult, 

Gastroesophageal Reflux Disease, Adult





Additional Instructions:  


Please take medication to help with GERD. Follow up with your primary care doct

or.


Scripts


Pantoprazole Sodium (PROTONIX) 20 Mg Tablet.dr


40 MG PO DAILY for 21 Days, #42 TAB


   Prov: PEDRITO JAEGER         6/2/19





Problem Qualifiers








   Primary Impression:  


   GERD (gastroesophageal reflux disease)


   Esophagitis presence:  with esophagitis  Qualified Codes:  K21.0 - Gastro-

   esophageal reflux disease with esophagitis








PEDRITO JAEGER           Jun 2, 2019 19:13


ROEL DONG MD             Jun 4, 2019 04:53

## 2019-11-04 ENCOUNTER — HOSPITAL ENCOUNTER (EMERGENCY)
Dept: HOSPITAL 61 - ER | Age: 74
Discharge: HOME | End: 2019-11-04
Payer: COMMERCIAL

## 2019-11-04 VITALS — DIASTOLIC BLOOD PRESSURE: 71 MMHG | SYSTOLIC BLOOD PRESSURE: 143 MMHG

## 2019-11-04 VITALS — HEIGHT: 61 IN | BODY MASS INDEX: 42.86 KG/M2 | WEIGHT: 227 LBS

## 2019-11-04 DIAGNOSIS — Z88.8: ICD-10-CM

## 2019-11-04 DIAGNOSIS — I10: ICD-10-CM

## 2019-11-04 DIAGNOSIS — N39.0: ICD-10-CM

## 2019-11-04 DIAGNOSIS — R31.9: ICD-10-CM

## 2019-11-04 DIAGNOSIS — M54.12: Primary | ICD-10-CM

## 2019-11-04 DIAGNOSIS — Z95.1: ICD-10-CM

## 2019-11-04 DIAGNOSIS — Z88.5: ICD-10-CM

## 2019-11-04 DIAGNOSIS — J45.909: ICD-10-CM

## 2019-11-04 DIAGNOSIS — Z98.51: ICD-10-CM

## 2019-11-04 DIAGNOSIS — Z88.1: ICD-10-CM

## 2019-11-04 LAB
ALBUMIN SERPL-MCNC: 3.6 G/DL (ref 3.4–5)
ALBUMIN/GLOB SERPL: 0.9 {RATIO} (ref 1–1.7)
ALP SERPL-CCNC: 67 U/L (ref 46–116)
ALT SERPL-CCNC: 19 U/L (ref 14–59)
ANION GAP SERPL CALC-SCNC: 13 MMOL/L (ref 6–14)
APTT PPP: YELLOW S
AST SERPL-CCNC: 26 U/L (ref 15–37)
BACTERIA #/AREA URNS HPF: 0 /HPF
BASOPHILS # BLD AUTO: 0.1 X10^3/UL (ref 0–0.2)
BASOPHILS NFR BLD: 1 % (ref 0–3)
BILIRUB SERPL-MCNC: 0.3 MG/DL (ref 0.2–1)
BILIRUB UR QL STRIP: NEGATIVE
BUN SERPL-MCNC: 18 MG/DL (ref 7–20)
BUN/CREAT SERPL: 20 (ref 6–20)
CALCIUM SERPL-MCNC: 9.1 MG/DL (ref 8.5–10.1)
CHLORIDE SERPL-SCNC: 103 MMOL/L (ref 98–107)
CO2 SERPL-SCNC: 24 MMOL/L (ref 21–32)
CREAT SERPL-MCNC: 0.9 MG/DL (ref 0.6–1)
EOSINOPHIL NFR BLD: 0.3 X10^3/UL (ref 0–0.7)
EOSINOPHIL NFR BLD: 3 % (ref 0–3)
ERYTHROCYTE [DISTWIDTH] IN BLOOD BY AUTOMATED COUNT: 15 % (ref 11.5–14.5)
FIBRINOGEN PPP-MCNC: CLEAR MG/DL
GFR SERPLBLD BASED ON 1.73 SQ M-ARVRAT: 74.5 ML/MIN
GLOBULIN SER-MCNC: 4.2 G/DL (ref 2.2–3.8)
GLUCOSE SERPL-MCNC: 92 MG/DL (ref 70–99)
HCT VFR BLD CALC: 36.1 % (ref 36–47)
HGB BLD-MCNC: 12.3 G/DL (ref 12–15.5)
LYMPHOCYTES # BLD: 2.8 X10^3/UL (ref 1–4.8)
LYMPHOCYTES NFR BLD AUTO: 30 % (ref 24–48)
MCH RBC QN AUTO: 32 PG (ref 25–35)
MCHC RBC AUTO-ENTMCNC: 34 G/DL (ref 31–37)
MCV RBC AUTO: 93 FL (ref 79–100)
MONO #: 0.7 X10^3/UL (ref 0–1.1)
MONOCYTES NFR BLD: 7 % (ref 0–9)
NEUT #: 5.4 X10^3/UL (ref 1.8–7.7)
NEUTROPHILS NFR BLD AUTO: 58 % (ref 31–73)
NITRITE UR QL STRIP: NEGATIVE
PH UR STRIP: 6.5 [PH]
PLATELET # BLD AUTO: 370 X10^3/UL (ref 140–400)
POTASSIUM SERPL-SCNC: 4.1 MMOL/L (ref 3.5–5.1)
PROT SERPL-MCNC: 7.8 G/DL (ref 6.4–8.2)
PROT UR STRIP-MCNC: NEGATIVE MG/DL
PROTHROMBIN TIME: 12.2 SEC (ref 11.7–14)
RBC # BLD AUTO: 3.88 X10^6/UL (ref 3.5–5.4)
RBC #/AREA URNS HPF: (no result) /HPF (ref 0–2)
SODIUM SERPL-SCNC: 140 MMOL/L (ref 136–145)
SQUAMOUS #/AREA URNS LPF: (no result) /LPF
UROBILINOGEN UR-MCNC: 1 MG/DL
WBC # BLD AUTO: 9.3 X10^3/UL (ref 4–11)
WBC #/AREA URNS HPF: (no result) /HPF (ref 0–4)

## 2019-11-04 PROCEDURE — 85610 PROTHROMBIN TIME: CPT

## 2019-11-04 PROCEDURE — 71045 X-RAY EXAM CHEST 1 VIEW: CPT

## 2019-11-04 PROCEDURE — 80053 COMPREHEN METABOLIC PANEL: CPT

## 2019-11-04 PROCEDURE — 87086 URINE CULTURE/COLONY COUNT: CPT

## 2019-11-04 PROCEDURE — 81001 URINALYSIS AUTO W/SCOPE: CPT

## 2019-11-04 PROCEDURE — 85025 COMPLETE CBC W/AUTO DIFF WBC: CPT

## 2019-11-04 PROCEDURE — 93005 ELECTROCARDIOGRAM TRACING: CPT

## 2019-11-04 PROCEDURE — 84484 ASSAY OF TROPONIN QUANT: CPT

## 2019-11-04 PROCEDURE — 36415 COLL VENOUS BLD VENIPUNCTURE: CPT

## 2019-11-04 NOTE — RAD
Examination: PORTABLE CHEST 1V

 

History: Chest pain

 

Comparison/Correlation: 3/8/2019 to the chest x-ray exam

 

Findings: Portable upright frontal view of the chest was obtained.

 

Heart size is borderline and this is probably artifactual. Limited 

pulmonary inflation noted. No focal infiltrate. No pleural effusion or 

pneumothorax. Bony structures are unremarkable for the patient's age. 

Right upper quadrant surgical clips are present.

 

 

Impression:

No infiltrate.

 

Electronically signed by: Jeff Ashton MD (11/4/2019 10:36 PM) [facility] [id number]

## 2019-11-04 NOTE — PHYS DOC
Past Medical History


Past Medical History:  Asthma, Hypertension


Additional Past Medical Histor:  Pericarditis, lichens planus,SCIATICA, BACK 

PAIN/STENOSIS


Past Surgical History:  Tonsillectomy, Tubal ligation


Additional Past Surgical Histo:  Gastric Bypass, fistula removal, breast glands 

removed (49 years ago).


Alcohol Use:  None


Drug Use:  None


The HEART Score for CP Pts


Risk Factors:


Risk Factors:  DM, Current or recent (<one month) smoker, HTN, HLP, family 

history of CAD, obesity.


Risk Scores:


Score 0 - 3:  2.5% MACE over next 6 weeks - Discharge Home


Score 4 - 6:  20.3% MACE over next 6 weeks - Admit for Clinical Observation


Score 7 - 10:  72.7% MACE over next 6 weeks - Early Invasive Strategies





Adult General


Chief Complaint


Chief Complaint:  CHEST WALL PAIN





HPI


HPI





Patient is a 74  year old Female who presents with Friday she was having pain in

her right neck and then she stated when she woke up time morning the right neck 

pain was gone but then she began having left lateral neck stabbing pain that 

radiated into the left breast into the left axillary and down the medial left 

arm. Patient states it's stabbing and shooting. Patient states the pain is worse

when she is lifting and moving arm or with cough. Patient states the pain is 

always there but is worse with movement. Patient rates her pain 9 out of 10. 

Patient took thousand milligrams of Tylenol this morning early.





She has a urinary tract infection. Blood work is unremarkable remarkable. I have

consulted with Dr. Vines on this patient and the care plan. Patient will be 

discharged home and to follow up with primary care provider.





Review of Systems


Review of Systems








Cardiovascular: Left chest


Musculoskeletal: Left neck, Left axillary, left arm. Denies back pain or joint 

pain []








All other systems were reviewed and found to be within normal limits, except as 

documented in this note.





Current Medications


Current Medications





Current Medications








 Medications


  (Trade)  Dose


 Ordered  Sig/Hugh  Start Time


 Stop Time Status Last Admin


Dose Admin


 


 Aspirin


  (Jennifer Aspirin)  325 mg  1X  ONCE  11/4/19 18:45


 11/4/19 18:46 DC 11/4/19 19:13


325 MG











Allergies


Allergies





Allergies








Coded Allergies Type Severity Reaction Last Updated Verified


 


  Trimethobenzamide HCl Allergy Intermediate  12/25/17 Yes


 


  amoxicillin Allergy Intermediate  8/23/18 Yes


 


  clavulanic acid Allergy Intermediate  8/23/18 Yes


 


  codeine Allergy Intermediate Tolerates Morphine 12/25/17 Yes


 


  dichloralphenazone Allergy Intermediate  12/25/17 Yes


 


  isometheptene mucate Allergy Intermediate  12/25/17 Yes


 


  levofloxacin Allergy Intermediate  12/25/17 Yes


 


  metoprolol Allergy Intermediate Rash 12/25/17 Yes











Physical Exam


Physical Exam





Constitutional: Well developed, well nourished, no acute distress, non-toxic 

appearance. []


HENT: Normocephalic, atraumatic, bilateral external ears normal, oropharynx 

moist, no oral exudates, nose normal. []


Eyes: PERRLA, EOMI, conjunctiva normal, no discharge. [] 


Neck: Normal range of motion, no tenderness, supple, no stridor. [] 


Cardiovascular:Heart rate regular rhythm, no murmur []


Lungs & Thorax:  Bilateral breath sounds clear to auscultation []


Abdomen: Bowel sounds normal, soft, no tenderness, no masses, no pulsatile 

masses. [] 


Skin: Warm, dry, no erythema, no rash. [] 


Back: No tenderness, no CVA tenderness. [] 


Extremities: No tenderness, no cyanosis, no clubbing, ROM intact, no edema. [] 


Neurologic: Alert and oriented X 3, normal motor function, normal sensory 

function, no focal deficits noted. []


Psychologic: Affect normal, judgement normal, mood normal.





**Normal Physical Exam []





Current Patient Data


Vital Signs





                                   Vital Signs








  Date Time  Temp Pulse Resp B/P (MAP) Pulse Ox O2 Delivery O2 Flow Rate FiO2


 


11/4/19 18:03 98.6 96 16 160/76 (104) 100 Room Air  





 98.6       








Lab Values





                                Laboratory Tests








Test


 11/4/19


18:10 11/4/19


18:42 11/4/19


19:30


 


Urine Color Yellow    


 


Urine Clarity Clear    


 


Urine pH 6.5    


 


Urine Specific Gravity 1.015    


 


Urine Protein


 Negative mg/dL


(NEG-TRACE) 


 





 


Urine Glucose (UA)


 Negative mg/dL


(NEG) 


 





 


Urine Ketones (Stick)


 Negative mg/dL


(NEG) 


 





 


Urine Blood Small (NEG)    


 


Urine Nitrite


 Negative (NEG)


 


 





 


Urine Bilirubin


 Negative (NEG)


 


 





 


Urine Urobilinogen Dipstick


 1.0 mg/dL (0.2


mg/dL) 


 





 


Urine Leukocyte Esterase


 Moderate (NEG)


 


 





 


Urine RBC


 1-2 /HPF (0-2)


 


 





 


Urine WBC


 20-40 /HPF


(0-4) 


 





 


Urine Squamous Epithelial


Cells Occ /LPF  


 


 





 


Urine Bacteria


 0 /HPF (0-FEW)


 


 





 


White Blood Count


 


 9.3 x10^3/uL


(4.0-11.0) 





 


Red Blood Count


 


 3.88 x10^6/uL


(3.50-5.40) 





 


Hemoglobin


 


 12.3 g/dL


(12.0-15.5) 





 


Hematocrit


 


 36.1 %


(36.0-47.0) 





 


Mean Corpuscular Volume


 


 93 fL ()


 





 


Mean Corpuscular Hemoglobin  32 pg (25-35)   


 


Mean Corpuscular Hemoglobin


Concent 


 34 g/dL


(31-37) 





 


Red Cell Distribution Width


 


 15.0 %


(11.5-14.5)  H 





 


Platelet Count


 


 370 x10^3/uL


(140-400) 





 


Neutrophils (%) (Auto)  58 % (31-73)   


 


Lymphocytes (%) (Auto)  30 % (24-48)   


 


Monocytes (%) (Auto)  7 % (0-9)   


 


Eosinophils (%) (Auto)  3 % (0-3)   


 


Basophils (%) (Auto)  1 % (0-3)   


 


Neutrophils # (Auto)


 


 5.4 x10^3/uL


(1.8-7.7) 





 


Lymphocytes # (Auto)


 


 2.8 x10^3/uL


(1.0-4.8) 





 


Monocytes # (Auto)


 


 0.7 x10^3/uL


(0.0-1.1) 





 


Eosinophils # (Auto)


 


 0.3 x10^3/uL


(0.0-0.7) 





 


Basophils # (Auto)


 


 0.1 x10^3/uL


(0.0-0.2) 





 


Prothrombin Time


 


 12.2 SEC


(11.7-14.0) 





 


Prothrombin Time INR  0.9 (0.8-1.1)   


 


Sodium Level


 


 


 140 mmol/L


(136-145)


 


Potassium Level


 


 


 4.1 mmol/L


(3.5-5.1)


 


Chloride Level


 


 


 103 mmol/L


()


 


Carbon Dioxide Level


 


 


 24 mmol/L


(21-32)


 


Anion Gap   13 (6-14)  


 


Blood Urea Nitrogen


 


 


 18 mg/dL


(7-20)


 


Creatinine


 


 


 0.9 mg/dL


(0.6-1.0)


 


Estimated GFR


(Cockcroft-Gault) 


 


 74.5  





 


BUN/Creatinine Ratio   20 (6-20)  


 


Glucose Level


 


 


 92 mg/dL


(70-99)


 


Calcium Level


 


 


 9.1 mg/dL


(8.5-10.1)


 


Total Bilirubin


 


 


 0.3 mg/dL


(0.2-1.0)


 


Aspartate Amino Transferase


(AST) 


 


 26 U/L (15-37)





 


Alanine Aminotransferase (ALT)


 


 


 19 U/L (14-59)





 


Alkaline Phosphatase


 


 


 67 U/L


()


 


Troponin I Quantitative


 


 


 < 0.017 ng/mL


(0.000-0.055)


 


Total Protein


 


 


 7.8 g/dL


(6.4-8.2)


 


Albumin


 


 


 3.6 g/dL


(3.4-5.0)


 


Albumin/Globulin Ratio


 


 


 0.9 (1.0-1.7)


L





                                Laboratory Tests


11/4/19 18:42








                                Laboratory Tests


11/4/19 19:30











EKG


EKG


Sinus Rhythm and no STEMI[]


Interpretation Time:


1822 and read by Dr Vines





Radiology/Procedures


Radiology/Procedures


[]





Course & Med Decision Making


Course & Med Decision Making


Alert and oriented. Speaks in full clear sentences. Denies any weaknesses. 

Ambulatory with a steady gait. Skin pink warm and dry. PERRLA. Patient denies sh

ortness of air, nausea, vomiting, abdominal pain, dizziness, syncope, recent 

illness, back pain, numbness or tingling. Patient states she's noticed the last 

couple days that when she wakes up her left hand will be asleep. Lungs are clear

 to auscultation lobes. Vital signs are within normal limits. Patient has equal 

strong strength in  in all extremities. Patient has full range of motion of

 her neck. Swelling in her extremities. Patient has a history of asthma, 

hypertension, gastric bypass and denies smoking.





Dragon Disclaimer


Dragon Disclaimer


This electronic medical record was generated, in whole or in part, using a voice

 recognition dictation system.





The HEART Score for CP Pts


HEART Score for Chest Pain:  








HEART Score for Chest Pain Response (Comments) Value


 


History Slighlty/Non-Suspicious 0


 


ECG Normal 0


 


Age > 65 2


 


Risk Factors                            1 or 2 Risk Factors 1


 


Troponin < Normal Limit 0


 


Total  3








Risk Factors:


Risk Factors:  DM, Current or recent (<one month) smoker, HTN, HLP, family 

history of CAD, obesity.


Risk Scores:


Score 0 - 3:  2.5% MACE over next 6 weeks - Discharge Home


Score 4 - 6:  20.3% MACE over next 6 weeks - Admit for Clinical Observation


Score 7 - 10:  72.7% MACE over next 6 weeks - Early Invasive Strategies





Departure


Departure


Impression:  


   Primary Impression:  


   Cervical radiculopathy


   Additional Impression:  


   UTI (urinary tract infection)


Disposition:  01 HOME, SELF-CARE


Condition:  STABLE


Referrals:  


RICHARD DEAL DO (PCP)


Patient Instructions:  Cervical Radiculopathy, Urinary Tract Infection





Additional Instructions:  


Follow up with a primary care doctor if needed. Use a heating pad and take 

medication as prescribed.


Scripts


Orphenadrine Citrate (ORPHENADRINE CITRATE) 100 Mg Tablet.er


1 TAB PO BID, #20 TAB


   Prov: LETI SAENZ APRN         11/4/19 


Methylprednisolone (MEDROL) 4 Mg Tab.ds.pk


1 PKG PO UD, #1 PKG


   Prov: LETI SAENZ APRN         11/4/19 


Cephalexin (KEFLEX) 500 Mg Capsule


1 CAP PO BID for 7 Days, #14 CAP 0 Refills


   Prov: LETI SAENZ APRN         11/4/19 


Ibuprofen (IBUPROFEN) 600 Mg Tablet


600 MG PO PRN Q6HRS PRN for INFLAMMATION, #20 TAB


   Prov: LETI SAENZ APRN         11/4/19





Problem Qualifiers








   Additional Impression:  


   UTI (urinary tract infection)


   Urinary tract infection type:  site unspecified  Hematuria presence:  with h

   ematuria  Qualified Codes:  N39.0 - Urinary tract infection, site not 

   specified; R31.9 - Hematuria, unspecified








LETI SAENZ APRN             Nov 4, 2019 18:37

## 2019-11-05 NOTE — EKG
[facility]

              [address] [place] [address]

Test Date:    2019               Test Time:    18:22:42

Pat Name:     TERESA MAXWELL               Department:   

Patient ID:   [id number]           Room:          

Gender:       F                        Technician:   

:          1945               Requested By: LETI SAENZ

Order Number: [id number]           Reading MD:     

                                 Measurements

Intervals                              Axis          

Rate:         84                       P:            37

[place]:           180                      QRS:          17

QRSD:         82                       T:            27

QT:           338                                    

QTc:          402                                    

                           Interpretive Statements

SINUS RHYTHM

LEFT ATRIAL ABNORMALITY

ST & T ABNORMALITY, CONSIDER RECENT

INFERIOR MYOCARDIAL OR PERICARDIAL DAMAGE

ABNORMAL ECG

RI6.01

No previous ECG available for comparison

## 2019-11-21 ENCOUNTER — HOSPITAL ENCOUNTER (EMERGENCY)
Dept: HOSPITAL 61 - ER | Age: 74
Discharge: HOME | End: 2019-11-21
Payer: COMMERCIAL

## 2019-11-21 VITALS — DIASTOLIC BLOOD PRESSURE: 65 MMHG | SYSTOLIC BLOOD PRESSURE: 151 MMHG

## 2019-11-21 VITALS — BODY MASS INDEX: 40.26 KG/M2 | HEIGHT: 61.5 IN | WEIGHT: 216 LBS

## 2019-11-21 DIAGNOSIS — Z90.89: ICD-10-CM

## 2019-11-21 DIAGNOSIS — Z88.6: ICD-10-CM

## 2019-11-21 DIAGNOSIS — Z98.51: ICD-10-CM

## 2019-11-21 DIAGNOSIS — Z88.8: ICD-10-CM

## 2019-11-21 DIAGNOSIS — M54.30: ICD-10-CM

## 2019-11-21 DIAGNOSIS — I10: ICD-10-CM

## 2019-11-21 DIAGNOSIS — Z88.0: ICD-10-CM

## 2019-11-21 DIAGNOSIS — Z88.5: ICD-10-CM

## 2019-11-21 DIAGNOSIS — Z90.10: ICD-10-CM

## 2019-11-21 DIAGNOSIS — M48.061: ICD-10-CM

## 2019-11-21 DIAGNOSIS — J20.9: Primary | ICD-10-CM

## 2019-11-21 DIAGNOSIS — J45.901: ICD-10-CM

## 2019-11-21 DIAGNOSIS — Z88.1: ICD-10-CM

## 2019-11-21 PROCEDURE — 94640 AIRWAY INHALATION TREATMENT: CPT

## 2019-11-21 PROCEDURE — 71045 X-RAY EXAM CHEST 1 VIEW: CPT

## 2019-11-21 PROCEDURE — 99284 EMERGENCY DEPT VISIT MOD MDM: CPT

## 2019-11-21 NOTE — RAD
Indication: Cough.

 

TECHNIQUE:Portable AP chest X-ray

 

COMPARISON: 11//2019

 

FINDINGS: Heart is normal in size. Lungs are clear. No pneumothorax or 

pleural effusion. Visualized bony thorax within normal limits.

 

IMPRESSION: No acute pulmonary process.

 

Electronically signed by: Yung Peña DO (11/21/2019 2:19 AM) [facility]

## 2019-11-21 NOTE — PHYS DOC
Past Medical History


Past Medical History:  Asthma, Bronchitis, Hypertension, Other


Additional Past Medical Histor:  Pericarditis, lichens planus, SCIATICA, BACK 

PAIN/STENOSIS


Past Surgical History:  Gastric Bypass, Tonsillectomy, Tubal ligation


Additional Past Surgical Histo:  fistula removal, breast glands removed


Alcohol Use:  None


Drug Use:  None





Adult General


Chief Complaint


Chief Complaint:  Congestion





HPI


HPI





74-year-old female presents to the emergency department with complaints of 

shortness of breath. Patient has underlying history of asthma, she was recently 

diagnosed with bronchitis on Tuesday. Patient received x-ray by her primary care

physician and was prescribed doxycycline, and prednisone. She describes 

increased sputum production tonight which is what brought her to the emergency 

department. States she's had difficulty with expectorating. Patient states she's

been on Advair and does not have nebulizer treatments at home. She's had 

intermittent chills. She denies any headache, visual change, chest pain, nausea,

vomiting, abdominal pain. Nothing makes her symptoms worse, nothing makes her 

symptoms better.





Review of Systems


Review of Systems





Constitutional: Denies fever or chills []


HENT: Nasal congestion


Respiratory: Cough, wheeze


Cardiovascular: No additional information not addressed in HPI []


GI: Denies abdominal pain, nausea, vomiting, bloody stools or diarrhea []


Musculoskeletal: Denies back pain or joint pain []


Integument: Denies rash or skin lesions []


Neurologic: Denies headache, focal weakness or sensory changes []





All other systems were reviewed and found to be within normal limits, except as 

documented in this note.





Current Medications


Current Medications





Current Medications








 Medications


  (Trade)  Dose


 Ordered  Sig/Hugh  Start Time


 Stop Time Status Last Admin


Dose Admin


 


 Albuterol/


 Ipratropium


  (Duoneb)  3 ml  1X  ONCE  19 03:30


 19 03:31 DC 19 03:38


3 ML











Allergies


Allergies





Allergies








Coded Allergies Type Severity Reaction Last Updated Verified


 


  Trimethobenzamide HCl Allergy Intermediate  17 Yes


 


  amoxicillin Allergy Intermediate  18 Yes


 


  clavulanic acid Allergy Intermediate  18 Yes


 


  codeine Allergy Intermediate Tolerates Morphine 17 Yes


 


  dichloralphenazone Allergy Intermediate  17 Yes


 


  isometheptene mucate Allergy Intermediate  17 Yes


 


  levofloxacin Allergy Intermediate  17 Yes


 


  metoprolol Allergy Intermediate Rash 17 Yes











Physical Exam


Physical Exam





Constitutional: Well developed, well nourished, no acute distress, non-toxic 

appearance. []


HENT: Normocephalic, atraumatic, bilateral external ears normal, oropharynx 

moist, no oral exudates, nose normal. []


Eyes: PERRLA, EOMI, conjunctiva normal, no discharge. [] 


Cardiovascular:Heart rate regular rhythm, no murmur []


Lungs & Thorax: Decreased breath sounds, expiratory wheeze


Abdomen: Bowel sounds normal, soft, no tenderness, no masses, no pulsatile 

masses. [] 


Skin: Warm, dry, no erythema, no rash. [] 


Back: No tenderness, no CVA tenderness. [] 


Extremities: No tenderness, no edema. [] 


Neurologic: Alert and oriented X 3,, no focal deficits noted. []


Psychologic: Affect normal, judgement normal, mood normal. []





Current Patient Data


Vital Signs





                                   Vital Signs








  Date Time  Temp Pulse Resp B/P (MAP) Pulse Ox O2 Delivery O2 Flow Rate FiO2


 


19 03:38     99 Room Air  


 


19 02:52  98 20 151/65 (93)    


 


19 01:49 98.2       





 98.2       











EKG


EKG


[]





Radiology/Procedures


Radiology/Procedures


[facility]


                    [address] [place] [address] [phone]


                                        


                                 IMAGING REPORT





                                     Signed





PATIENT: TERESA MAXWELL     ACCOUNT: [id number]     MRN#: [id number]


: 1945           LOCATION: ER              AGE: 74


SEX: F                    EXAM DT: 19         ACCESSION#: [id number]


STATUS: REG ER            ORD. PHYSICIAN: MIRIAM MORTENSEN MD


REASON: cough/sputum production/SOB


PROCEDURE: CHEST AP ONLY





Indication: Cough.


 


TECHNIQUE:Portable AP chest X-ray


 


COMPARISON: 2019


 


FINDINGS: Heart is normal in size. Lungs are clear. No pneumothorax or 


pleural effusion. Visualized bony thorax within normal limits.


 


IMPRESSION: No acute pulmonary process.


 


Electronically signed by: Yung Peña DO (2019 2:19 AM) [id number]














DICTATED and SIGNED BY:     YUNG PEÑA DO


DATE:     19 0219


[]





Course & Med Decision Making


Course & Med Decision Making


Pertinent Labs and Imaging studies reviewed. (See chart for details)





[]74-year-old female presents to the emergency department with complaints of 

shortness of breath. Patient has underlying history of asthma, she was recently 

diagnosed with bronchitis on Tuesday. Patient received x-ray by her primary care

 physician and was prescribed doxycycline, and prednisone. She describes 

increased sputum production tonight which is what brought her to the emergency 

department. States she's had difficulty with expectorating. Patient states she's

 been on Advair and does not have nebulizer treatments at home. She's had 

intermittent chills. She denies any headache, visual change, chest pain, nausea,

 vomiting, abdominal pain. Nothing makes her symptoms worse, nothing makes her 

symptoms better.





Patient presents with complaints as described above. X-ray reviewed without 

evidence of acute cardiac palmarly process.


Patient initially provided with DuoNeb 1 with improvement, subsequent repeat 

prior to patient's discharge home.


Discussed discharge plans, patient states she feels better, saturations 95-97% 

on room air. She feels her wheezing is improved.


Plan for discharge home recommend continue doxycycline, prednisone as previous 

described. Pressures are provided for nebulizer treatments upon discharge.





Dragon Disclaimer


Dragon Disclaimer


This electronic medical record was generated, in whole or in part, using a voice

 recognition dictation system.





Departure


Departure


Impression:  


   Primary Impression:  


   Acute bronchitis


   Additional Impression:  


   Asthma exacerbation


Disposition:   HOME, SELF-CARE


Condition:  IMPROVED


Referrals:  


RICHARD DEAL DO (PCP)


Patient Instructions:  Acute Bronchitis, Easy-to-Read, Asthma, Adult, 

Easy-to-Read





Additional Instructions:  


Recommend follow up with PCP 3 - 5 days


Return to the ER with worsening symptoms, intractable pain, fever, altered 

mental status


Tylenol/Motrin as needed for pain


Take abx as previously prescribed


Albuterol neb rx provided


Scripts


Albuterol Sulfate (ALBUTEROL SULFATE NEB SOLN) 2.5 Mg/3 Ml Vial.neb


1 VIAL NEB Q6HRS PRN for SHORTNESS OF BREATH, #25 VIAL


   Prov: MIRIAM MORTENSEN MD         19





Problem Qualifiers








   Primary Impression:  


   Acute bronchitis


   Bronchitis organism:  unspecified organism  Qualified Codes:  J20.9 - Acute 

   bronchitis, unspecified


   Additional Impression:  


   Asthma exacerbation


   Asthma severity:  mild  Asthma persistence:  unspecified  Qualified Codes:  

   J45.901 - Unspecified asthma with (acute) exacerbation








MIRIAM MORTENSEN MD              2019 03:42

## 2021-12-27 ENCOUNTER — HOSPITAL ENCOUNTER (EMERGENCY)
Dept: HOSPITAL 61 - ER | Age: 76
LOS: 1 days | Discharge: HOME | End: 2021-12-28
Payer: COMMERCIAL

## 2021-12-27 VITALS — BODY MASS INDEX: 38.58 KG/M2 | HEIGHT: 61 IN | WEIGHT: 204.37 LBS

## 2021-12-27 DIAGNOSIS — Z88.5: ICD-10-CM

## 2021-12-27 DIAGNOSIS — I10: ICD-10-CM

## 2021-12-27 DIAGNOSIS — U07.1: Primary | ICD-10-CM

## 2021-12-27 DIAGNOSIS — Z88.1: ICD-10-CM

## 2021-12-27 DIAGNOSIS — Z88.8: ICD-10-CM

## 2021-12-27 DIAGNOSIS — J45.909: ICD-10-CM

## 2021-12-27 PROCEDURE — 99284 EMERGENCY DEPT VISIT MOD MDM: CPT

## 2021-12-28 VITALS — SYSTOLIC BLOOD PRESSURE: 136 MMHG | DIASTOLIC BLOOD PRESSURE: 72 MMHG

## 2021-12-28 NOTE — PHYS DOC
Past Medical History


Past Medical History:  Asthma, Bronchitis, Hypertension, Other


Additional Past Medical Histor:  Pericarditis, lichens planus, SCIATICA, BACK 

PAIN/STENOSIS


Past Surgical History:  Gastric Bypass, Tonsillectomy, Tubal ligation


Additional Past Surgical Histo:  fistula removal, breast glands removed


Smoking Status:  Never Smoker


Alcohol Use:  None


Drug Use:  None





General Adult


EDM:


Chief Complaint:  Congestion





HPI:


HPI:





Patient is a 76  year old female presents for evaluation of Covid-like symptoms.

 Patient was diagnosed with COVID-19 last week.  She was placed on Medrol 

Dosepak and doxycycline.  Patient states early yesterday morning she felt as if 

she was going to pass out.  Patient continues to have a cough with some sputum 

production.  States prior to arrival she took her oxygen and noted to be in the 

upper 80s to the 90s.  Patient was concerned therefore presented to the emergen

cy department for evaluation.





At the time my exam patient is alert in order x4 she is in no acute respiratory 

distress.  Her oxygen saturation was 95 to 97% on room air.





Review of Systems:


Constitutional symptoms-  No fever, no chills.


Eyes- No Discharge, No Visual Loss


Respiratory symptoms-positive shortness of breath, No wheezing, No Dyspnea on 

Exertion positive cough


Cardiovascular Systems; No chest pain, No Palpitations, No syncope


Gastrointestinal symptoms:  NO abdominal pain, no nausea, no vomiting or 

diarrhea.


Genitourinary symptoms:  No dysuria.  


Musculoskeletal symptoms:  No back pain  No extremity pain.


NEUROLOGICAL Symptoms:  No headache, no generalized weakness; No focal Weakness 


Skin: No rash.





Heart Score:


C/O Chest Pain:  N/A


Risk Factors:


Risk Factors:  DM, Current or recent (<one month) smoker, HTN, HLP, family 

history of CAD, obesity.


Risk Scores:


Score 0 - 3:  2.5% MACE over next 6 weeks - Discharge Home


Score 4 - 6:  20.3% MACE over next 6 weeks - Admit for Clinical Observation


Score 7 - 10:  72.7% MACE over next 6 weeks - Early Invasive Strategies





Allergies:


Allergies:





Allergies








Coded Allergies Type Severity Reaction Last Updated Verified


 


  Trimethobenzamide HCl Allergy Intermediate  12/25/17 Yes


 


  amoxicillin Allergy Intermediate  8/23/18 Yes


 


  clavulanic acid Allergy Intermediate  8/23/18 Yes


 


  codeine Allergy Intermediate Tolerates Morphine 12/25/17 Yes


 


  dichloralphenazone Allergy Intermediate  12/25/17 Yes


 


  isometheptene mucate Allergy Intermediate  12/25/17 Yes


 


  levofloxacin Allergy Intermediate  12/25/17 Yes


 


  metoprolol Allergy Intermediate Rash 12/25/17 Yes











Physical Exam:


PE:





Constitutional: Well developed, well nourished, no acute distress, non-toxic 

appearance. []


HENT: Normocephalic, atraumatic, bilateral external ears normal, oropharynx 

moist, no oral exudates, nose normal. []


Eyes: PERRLA, EOMI, conjunctiva normal, no discharge. [] 


Neck: Normal range of motion, no tenderness, supple, no stridor. [] 


Cardiovascular:Heart rate regular rhythm, no murmur []


Lungs & Thorax:  Bilateral breath sounds clear to auscultation []


Abdomen: Bowel sounds normal, soft, no tenderness, no masses, no pulsatile 

masses. [] 


Skin: Warm, dry, no erythema, no rash. [] 


Back: No tenderness, no CVA tenderness. [] 


Extremities: No tenderness, no cyanosis, no clubbing, ROM intact, no edema. [] 


Neurologic: Alert and oriented X 3, normal motor function, normal sensory 

function, no focal deficits noted. []


Psychologic: Affect normal, judgement normal, mood normal. []





Current Patient Data:


Vital Signs:





                                   Vital Signs








  Date Time  Temp Pulse Resp B/P (MAP) Pulse Ox O2 Delivery O2 Flow Rate FiO2


 


12/28/21 02:20 98.6 96 16 136/72 (93) 98 Room Air  





 98.6       











EKG:


EKG:


[]





Radiology/Procedures:


Radiology/Procedures:


[]





Course & Med Decision Making:


Course & Med Decision Making


Pertinent Labs and Imaging studies reviewed. (See chart for details)





[]





Dragon Disclaimer:


Dragon Disclaimer:


This electronic medical record was generated, in whole or in part, using a voice

 recognition dictation system.





Departure


Departure


Impression:  


   Primary Impression:  


   COVID-19


Disposition:  01 HOME / SELF CARE / HOMELESS


Condition:  STABLE


Referrals:  


RICHARD DEAL DO (PCP)


Patient Instructions:  Viral Syndrome





Additional Instructions:  


You have been tested for or diagnosed with COVID-19. It is an infection caused 

by a new type 


of coronavirus. COVID-19 will cause cold-like or mild flu symptoms in most. It 

can cause 


more severe symptoms like problems breathing in some.





There is no treatment for COVID-19. The body will clear the infection over time.

 Self-care 


will help to ease discomfort.





Steps to Take:


Self-Care


Rest as needed. Healthy habits may help you feel better. Steps include:





Choose healthy foods including fruits and vegetables. Drink water throughout the

 day.


Get plenty of sleep each night.


If you smoke, try to quit. It may ease breathing.


Avoid alcohol.


Keep Others Healthy


The virus can spread to others. Droplets are released every time you sneeze or 

cough. The 


droplets can get into the mouth, nose, or eyes of people near you and lead to 

infection. To 


lower the chances of spreading COVID-19 to others:





Stay at home until your doctor has said it is safe to leave. If you tested p

ositive this 


will mean staying isolated until both of the following are true:





At least 7 days have passed since the start of illness.


You are free of fever for at least 72 hours without the use of medicine.


During this time:





 - Avoid public areas, events, or transportation. Do not return to work or 

school until your 


doctor has said it is safe to do so.


 - Call ahead if you need to go to a medical center. Let them know you may have 

COVID-19. It 


will help them guide you where to go. They may also ask you to wear a facemask 

when you come 


to the office.


 - If you call for emergency medical services, let them know you may have COVID-

19.


While at home:





 - Try to avoid close contact with others. Stay about 6 feet away.


 - If possible, spend most of your time in a separate room from others.


 - Use a face mask if you will be in close contact with others such as sharing a

 room or 


vehicle.


 - Have someone wipe down common surfaces in the home. Use household  

every day on 


areas like doorknobs, counters, or sinks.


 - Cough or sneeze into a tissue. Throw the tissue away right after use. If a 

tissue is not 


available, cough or sneeze into your elbow.


 - Wash your hands often. Wash them after sneezing or coughing. Use soap and 

water and wash 


for at least 20 seconds. Alcohol based hand  can be used if soap and 

water is not 


available.


 - Do not prepare food for others. Avoid sharing personal items like forks, 

spoons, or 


toothbrushes.


 - Avoid close contact with pets while you are sick. There is no evidence of the

 virus 


passing to pets. This is a safety step until more is known about this virus.


Isolation can be frustrating. Social interaction can help. Keep in touch with 

friends and 


family through phone and tech options. You can still interact with others in 

your home, just 


keep a safe distance of about 6 feet.





Follow-up:


Your doctors office will check in with you to see if there are any changes in 

your health. 


You may be asked to keep track of symptoms to share with them. They will also 

let you know 


when you are clear to be in public again.





Problems to Look Out For:


Contact your doctor if your recovery is not going as you expect. Get emergency 

care if you 


have problems such as:





 - Trouble breathing


 - Nonstop chest pain or pressure


 - Changes in awareness, confusion, or problems waking


 - Lips or face have bluish color


 - Worsening of symptoms


If you think you have an emergency, call for emergency medical services right 

away.





[place] taken [place] [facility] [place]            Dec 28, 2021 02:51

## 2022-03-06 ENCOUNTER — HOSPITAL ENCOUNTER (EMERGENCY)
Dept: HOSPITAL 61 - ER | Age: 77
Discharge: HOME | End: 2022-03-06
Payer: COMMERCIAL

## 2022-03-06 VITALS — HEIGHT: 61 IN | BODY MASS INDEX: 42.87 KG/M2 | WEIGHT: 227.08 LBS

## 2022-03-06 VITALS — DIASTOLIC BLOOD PRESSURE: 80 MMHG | SYSTOLIC BLOOD PRESSURE: 166 MMHG

## 2022-03-06 DIAGNOSIS — I10: ICD-10-CM

## 2022-03-06 DIAGNOSIS — M25.512: ICD-10-CM

## 2022-03-06 DIAGNOSIS — M25.511: Primary | ICD-10-CM

## 2022-03-06 DIAGNOSIS — J45.909: ICD-10-CM

## 2022-03-06 DIAGNOSIS — M79.642: ICD-10-CM

## 2022-03-06 DIAGNOSIS — Z88.8: ICD-10-CM

## 2022-03-06 DIAGNOSIS — M25.532: ICD-10-CM

## 2022-03-06 DIAGNOSIS — Z88.5: ICD-10-CM

## 2022-03-06 DIAGNOSIS — M25.522: ICD-10-CM

## 2022-03-06 DIAGNOSIS — Z88.1: ICD-10-CM

## 2022-03-06 DIAGNOSIS — Z98.84: ICD-10-CM

## 2022-03-06 PROCEDURE — 99284 EMERGENCY DEPT VISIT MOD MDM: CPT

## 2022-03-06 PROCEDURE — 93005 ELECTROCARDIOGRAM TRACING: CPT

## 2022-03-06 PROCEDURE — 73130 X-RAY EXAM OF HAND: CPT

## 2022-03-06 PROCEDURE — 73080 X-RAY EXAM OF ELBOW: CPT

## 2022-03-06 NOTE — RAD
Exam: Left hand 3 views



INDICATION: Pain



TECHNIQUE: Frontal, lateral and oblique views of the left hand



Comparisons: None



FINDINGS:

Bone mineralization is normal. No acute or healed fractures. Soft tissues are unremarkable. Degenerat
evan change at the first CMC joint.



IMPRESSION:

No acute osseous abnormality.



Electronically signed by: Antonio Diego MD (3/6/2022 7:57 PM) [place]

## 2022-03-06 NOTE — PHYS DOC
Past Medical History


Past Medical History:  Arthritis, Asthma, Bronchitis, Hypertension, Other


Additional Past Medical Histor:  Pericarditis, lichens planus, SCIATICA, BACK 

PAIN/STENOSIS. COVID 12/21


 (GRICEL GODINEZ FREDDY APRN)


Past Surgical History:  Gastric Bypass, Tonsillectomy, Tubal ligation


Additional Past Surgical Histo:  fistula removal, breast glands removed, HERNIA


 (GRICEL GODINEZ FREDDY APRN)


Smoking Status:  Never Smoker


Alcohol Use:  None


Drug Use:  None


 (GRICEL GODINEZ FREDDY APRN)





General Adult


EDM:


Chief Complaint:  UPPER EXTREMITY PAIN





HPI:


HPI:





Patient is a 76  year old female with history of arthritis, hypertension, 

presenting today complaining of 9 out of 10 sharp intermittent left wrist pain, 

left hand pain, left elbow pain, and bilateral shoulder pain, symptoms have been

going on since yesterday.  Patient states she has tried taking her Tylenol for 

arthritis with minimal relief.  Denies any injuries.  Denies any chest pain or 

shortness of breath.  States the pain is worse on movement.  She states the pain

was so bad today she was not able to get into her winter jacket to come to the 

ED.


 (GRICEL GODINEZ FREDDY APRN)





Review of Systems:


Review of Systems:


Constitutional:   Denies fever or chills. []


Eyes:   Denies change in visual acuity. []


HENT:   Denies nasal congestion or sore throat. [] 


Respiratory:   Denies cough or shortness of breath. [] 


Cardiovascular:   Denies chest pain or edema. [] 


GI:   Denies abdominal pain, nausea, vomiting, bloody stools or diarrhea. [] 


:  Denies dysuria. [] 


Musculoskeletal:   Reports left hand pain, left wrist pain, left elbow pain, 

bilateral shoulder pain


Integument:   Denies rash. [] 


Neurologic:   Denies headache, focal weakness or sensory changes. []  


Psychiatric:  Denies depression or anxiety. []


 (GRICEL GODINEZ APRN)





Heart Score:


C/O Chest Pain:  N/A


Risk Factors:


Risk Factors:  DM, Current or recent (<one month) smoker, HTN, HLP, family 

history of CAD, obesity.


Risk Scores:


Score 0 - 3:  2.5% MACE over next 6 weeks - Discharge Home


Score 4 - 6:  20.3% MACE over next 6 weeks - Admit for Clinical Observation


Score 7 - 10:  72.7% MACE over next 6 weeks - Early Invasive Strategies


 (GRICEL GODINEZ APRN)





Allergies:


Allergies:





Allergies








Coded Allergies Type Severity Reaction Last Updated Verified


 


  Trimethobenzamide HCl Allergy Intermediate  12/25/17 Yes


 


  amoxicillin Allergy Intermediate  8/23/18 Yes


 


  clavulanic acid Allergy Intermediate  8/23/18 Yes


 


  codeine Allergy Intermediate Tolerates Morphine 12/25/17 Yes


 


  dichloralphenazone Allergy Intermediate  12/25/17 Yes


 


  isometheptene mucate Allergy Intermediate  12/25/17 Yes


 


  levofloxacin Allergy Intermediate  12/25/17 Yes


 


  metoprolol Allergy Intermediate Rash 12/25/17 Yes








 (GRICEL GODINEZ APRN)





Physical Exam:


PE:





Constitutional: Well developed, well nourished, no acute distress, non-toxic 

appearance. []


HENT: Normocephalic, atraumatic, bilateral external ears normal, oropharynx 

moist, no oral exudates, nose normal. []


Eyes: PERRLA, EOMI, conjunctiva normal, no discharge. [] 


Neck: Normal range of motion, no tenderness, supple, no stridor. [] 


Cardiovascular:Heart rate regular rhythm, no murmur []


Lungs & Thorax:  Bilateral breath sounds clear to auscultation []


Abdomen: Bowel sounds normal, soft, no tenderness, no masses, no pulsatile 

masses. [] 


Skin: Warm, dry, no erythema, no rash. [] 


Back: No tenderness, no CVA tenderness. [] 


Extremities: Bilateral upper extremities with no obvious deformities.  Patient 

has scabbing on the left thumb nailbed, she states is from a biopsy done that 

dermatologist a couple months ago.  She states the biopsy was negative for 

cancer but still follows up with dermatologist.  No tenderness, no cyanosis, no 

clubbing, ROM intact, no edema.  Decreased radial, medial, ulnar sensation to 

bilateral upper extremities.  +2 bilateral radial pulses


Neurologic: Alert and oriented X 3, normal motor function, normal sensory 

function, no focal deficits noted. []


Psychologic: Affect normal, judgement normal, mood normal. []


 (GRICEL GODINEZ APRN)





Current Patient Data:


Vital Signs:





                                   Vital Signs








  Date Time  Temp Pulse Resp B/P (MAP) Pulse Ox O2 Delivery O2 Flow Rate FiO2


 


3/6/22 16:08 98.5 86 20 166/80 (108) 98 Room Air  





 98.5       








 (GRICEL GODINEZ GURDEEP)





EKG:


EKG:


[]


 (GRICEL GODINEZ GURDEEP)





Radiology/Procedures:


Radiology/Procedures:


[]


 (GRICEL GODINEZ GURDEEP)





Course & Med Decision Making:


Course & Med Decision Making


Pertinent Labs and Imaging studies reviewed. (See chart for details)





This is a 76-year-old female patient presented to the ED today complaining of 

left hand pain, left wrist pain, left elbow pain and bilateral shoulder pain





EKG is negative.  X-rays of bilateral shoulders, left elbow and left hand 

interpreted by Dr. Luna and negative for any acute findings.  Discharged home.

  Provided Ortho for follow-up.








 (GRICEL GODINEZ GURDEEP)


Course & Med Decision Making


Patients Care and treatment plan provided by ER Nurse Practitioner.  I was not 

involved in this patients care but was available for consult.  Patient's chart 

reviewed.


 (MADY LUNA DO)


Dragon Disclaimer:


Dragon Disclaimer:


This electronic medical record was generated, in whole or in part, using a voice

 recognition dictation system.


 (GRICLE GODINEZ GURDEEP)





Departure


Departure


Impression:  


   Primary Impression:  


   Bilateral shoulder pain


   Qualified Codes:  M25.511 - Pain in right shoulder; M25.512 - Pain in left 

   shoulder


   Additional Impressions:  


   Left hand pain


   Left elbow pain


Disposition:  01 HOME / SELF CARE / HOMELESS


Condition:  STABLE


Referrals:  


RICHARD DEAL DO (PCP)


follow up with your doctor in one week





LOYDA CHENEY Jr. DO


follow up in one week


Patient Instructions:  Musculoskeletal Pain, Shoulder Pain, Easy-to-Read





Additional Instructions:  


You were evaluated in the emergency room for pain, we highly recommend you 

follow-up with the provided specialist in the next 1 to 2 weeks.  Use the 

prescribed medications as ordered.  Come back to the ED at any point symptoms 

worsen


Scripts


Methylprednisolone (MEDROL) 4 Mg Tab.ds.pk


1 PKG PO UD, #1 PKG


   Prov: GRICEL GODINEZ GURDEEP         3/6/22











GRICEL GODINEZ GURDEEP             Mar 6, 2022 17:18


MADY LUNA DO             Mar 8, 2022 18:01

## 2022-03-06 NOTE — RAD
Exam: Bilateral shoulder 3 views



INDICATION: Pain



TECHNIQUE: Frontal view of the shoulders with internal and external rotation and transscapular Y view
s



Comparisons: None



FINDINGS:



Left shoulder:

Bone mineralization is normal. No acute or healed fractures. Soft tissues are unremarkable. Joint spa
iraida are well-maintained. Complete loss of the subacromial space.



Right shoulder:

Bone mineralization is normal. No acute or healed fractures. Soft tissues are unremarkable. Degenerat
evan change at the right glenohumeral joint. Complete loss of the subacromial space.





IMPRESSION:

1.  Degenerative change at the right glenohumeral joint.

2.  Loss of subacromial space bilaterally just imaging chronic rotator cuff tears



Electronically signed by: Antonio Diego MD (3/6/2022 7:56 PM) [place]

## 2022-03-06 NOTE — RAD
Exam: Left elbow 3 views



INDICATION: Pain



TECHNIQUE: Frontal, lateral and oblique views of the left elbow



Comparisons: None



FINDINGS:

Bone mineralization is normal. No acute or healed fractures. Soft tissues are unremarkable. Joint spa
iraida are well-maintained.



IMPRESSION:

No acute osseous abnormality



Electronically signed by: Antonio Diego MD (3/6/2022 7:57 PM) [place]

## 2022-03-07 NOTE — EKG
[facility]

              [address] [place] [address]

Test Date:    2022               Test Time:    17:28:12

Pat Name:     TERESA MAXWELL               Department:   

Patient ID:   [id number]           Room:          

Gender:       F                        Technician:   

:          [id number]               Requested By: GRICEL GODINEZ

Order Number: [id number]           Reading MD:   eTz Chambers MD

                                 Measurements

Intervals                              Axis          

Rate:         64                       P:            33

OR:           194                      QRS:          12

QRSD:         82                       T:            8

QT:           368                                    

QTc:          383                                    

                           Interpretive Statements

SINUS RHYTHM

Electronically Signed On 3-7-2022 9:48:13 CST by Tez Chambers MD